# Patient Record
Sex: FEMALE | Race: WHITE | Employment: UNEMPLOYED | URBAN - METROPOLITAN AREA
[De-identification: names, ages, dates, MRNs, and addresses within clinical notes are randomized per-mention and may not be internally consistent; named-entity substitution may affect disease eponyms.]

---

## 2018-01-27 ENCOUNTER — HOSPITAL ENCOUNTER (OUTPATIENT)
Facility: HOSPITAL | Age: 33
Setting detail: OBSERVATION
Discharge: HOME | End: 2018-01-28
Attending: EMERGENCY MEDICINE | Admitting: HOSPITALIST
Payer: COMMERCIAL

## 2018-01-27 DIAGNOSIS — Z94.0 S/P KIDNEY TRANSPLANT: ICD-10-CM

## 2018-01-27 DIAGNOSIS — R11.2 NAUSEA VOMITING AND DIARRHEA: Primary | ICD-10-CM

## 2018-01-27 DIAGNOSIS — D72.829 LEUKOCYTOSIS, UNSPECIFIED TYPE: ICD-10-CM

## 2018-01-27 DIAGNOSIS — R19.7 NAUSEA VOMITING AND DIARRHEA: Primary | ICD-10-CM

## 2018-01-27 DIAGNOSIS — N28.9 RENAL INSUFFICIENCY: ICD-10-CM

## 2018-01-27 LAB
ALBUMIN SERPL-MCNC: 4.1 G/DL (ref 3.5–4.8)
ALP LIVER SERPL-CCNC: 124 U/L (ref 37–98)
ALT SERPL-CCNC: 44 U/L (ref 14–54)
AST SERPL-CCNC: 17 U/L (ref 15–41)
BASOPHILS # BLD AUTO: 0.08 X10(3) UL (ref 0–0.1)
BASOPHILS NFR BLD AUTO: 0.4 %
BILIRUB SERPL-MCNC: 0.6 MG/DL (ref 0.1–2)
BUN BLD-MCNC: 32 MG/DL (ref 8–20)
CALCIUM BLD-MCNC: 10.2 MG/DL (ref 8.3–10.3)
CHLORIDE: 103 MMOL/L (ref 101–111)
CO2: 21 MMOL/L (ref 22–32)
CREAT BLD-MCNC: 1.68 MG/DL (ref 0.55–1.02)
EOSINOPHIL # BLD AUTO: 0.3 X10(3) UL (ref 0–0.3)
EOSINOPHIL NFR BLD AUTO: 1.5 %
ERYTHROCYTE [DISTWIDTH] IN BLOOD BY AUTOMATED COUNT: 15 % (ref 11.5–16)
GLUCOSE BLD-MCNC: 147 MG/DL (ref 70–99)
HCT VFR BLD AUTO: 33.1 % (ref 34–50)
HGB BLD-MCNC: 10.8 G/DL (ref 12–16)
IMMATURE GRANULOCYTE COUNT: 0.11 X10(3) UL (ref 0–1)
IMMATURE GRANULOCYTE RATIO %: 0.6 %
LYMPHOCYTES # BLD AUTO: 1.48 X10(3) UL (ref 0.9–4)
LYMPHOCYTES NFR BLD AUTO: 7.5 %
M PROTEIN MFR SERPL ELPH: 8 G/DL (ref 6.1–8.3)
MCH RBC QN AUTO: 26.4 PG (ref 27–33.2)
MCHC RBC AUTO-ENTMCNC: 32.6 G/DL (ref 31–37)
MCV RBC AUTO: 80.9 FL (ref 81–100)
MONOCYTES # BLD AUTO: 1.01 X10(3) UL (ref 0.1–0.6)
MONOCYTES NFR BLD AUTO: 5.1 %
NEUTROPHIL ABS PRELIM: 16.76 X10 (3) UL (ref 1.3–6.7)
NEUTROPHILS # BLD AUTO: 16.76 X10(3) UL (ref 1.3–6.7)
NEUTROPHILS NFR BLD AUTO: 84.9 %
PLATELET # BLD AUTO: 526 10(3)UL (ref 150–450)
POTASSIUM SERPL-SCNC: 4.7 MMOL/L (ref 3.6–5.1)
RBC # BLD AUTO: 4.09 X10(6)UL (ref 3.8–5.1)
RED CELL DISTRIBUTION WIDTH-SD: 44 FL (ref 35.1–46.3)
SODIUM SERPL-SCNC: 135 MMOL/L (ref 136–144)
WBC # BLD AUTO: 19.7 X10(3) UL (ref 4–13)

## 2018-01-27 PROCEDURE — 99220 INITIAL OBSERVATION CARE,LEVL III: CPT | Performed by: HOSPITALIST

## 2018-01-27 RX ORDER — SODIUM CHLORIDE 9 MG/ML
INJECTION, SOLUTION INTRAVENOUS ONCE
Status: COMPLETED | OUTPATIENT
Start: 2018-01-27 | End: 2018-01-27

## 2018-01-27 RX ORDER — DEXTROSE, SODIUM CHLORIDE, SODIUM LACTATE, POTASSIUM CHLORIDE, AND CALCIUM CHLORIDE 5; .6; .31; .03; .02 G/100ML; G/100ML; G/100ML; G/100ML; G/100ML
INJECTION, SOLUTION INTRAVENOUS CONTINUOUS
Status: DISCONTINUED | OUTPATIENT
Start: 2018-01-27 | End: 2018-01-28

## 2018-01-27 RX ORDER — MORPHINE SULFATE 2 MG/ML
4 INJECTION, SOLUTION INTRAMUSCULAR; INTRAVENOUS EVERY 2 HOUR PRN
Status: CANCELLED | OUTPATIENT
Start: 2018-01-27

## 2018-01-27 RX ORDER — ONDANSETRON 2 MG/ML
4 INJECTION INTRAMUSCULAR; INTRAVENOUS ONCE
Status: COMPLETED | OUTPATIENT
Start: 2018-01-27 | End: 2018-01-27

## 2018-01-27 RX ORDER — MYCOPHENOLIC ACID 360 MG/1
360 TABLET, DELAYED RELEASE ORAL
Status: DISCONTINUED | OUTPATIENT
Start: 2018-01-27 | End: 2018-01-27

## 2018-01-27 RX ORDER — ONDANSETRON 2 MG/ML
4 INJECTION INTRAMUSCULAR; INTRAVENOUS EVERY 4 HOURS PRN
Status: DISCONTINUED | OUTPATIENT
Start: 2018-01-27 | End: 2018-01-28

## 2018-01-27 RX ORDER — ONDANSETRON 2 MG/ML
4 INJECTION INTRAMUSCULAR; INTRAVENOUS EVERY 6 HOURS PRN
Status: DISCONTINUED | OUTPATIENT
Start: 2018-01-27 | End: 2018-01-28

## 2018-01-27 RX ORDER — TACROLIMUS 1 MG/1
3 CAPSULE ORAL 2 TIMES DAILY
Status: DISCONTINUED | OUTPATIENT
Start: 2018-01-27 | End: 2018-01-28

## 2018-01-27 RX ORDER — TACROLIMUS 1 MG/1
2 CAPSULE ORAL 2 TIMES DAILY
Status: DISCONTINUED | OUTPATIENT
Start: 2018-01-27 | End: 2018-01-27

## 2018-01-27 RX ORDER — MORPHINE SULFATE 2 MG/ML
2 INJECTION, SOLUTION INTRAMUSCULAR; INTRAVENOUS EVERY 2 HOUR PRN
Status: DISCONTINUED | OUTPATIENT
Start: 2018-01-27 | End: 2018-01-28

## 2018-01-27 RX ORDER — MYCOPHENOLIC ACID 180 MG/1
180 TABLET, DELAYED RELEASE ORAL
Status: DISCONTINUED | OUTPATIENT
Start: 2018-01-27 | End: 2018-01-27

## 2018-01-27 RX ORDER — MORPHINE SULFATE 2 MG/ML
2 INJECTION, SOLUTION INTRAMUSCULAR; INTRAVENOUS EVERY 2 HOUR PRN
Status: CANCELLED | OUTPATIENT
Start: 2018-01-27

## 2018-01-27 RX ORDER — MORPHINE SULFATE 2 MG/ML
4 INJECTION, SOLUTION INTRAMUSCULAR; INTRAVENOUS EVERY 2 HOUR PRN
Status: DISCONTINUED | OUTPATIENT
Start: 2018-01-27 | End: 2018-01-28

## 2018-01-27 RX ORDER — MYCOPHENOLIC ACID 180 MG/1
180 TABLET, DELAYED RELEASE ORAL
Status: DISCONTINUED | OUTPATIENT
Start: 2018-01-28 | End: 2018-01-28

## 2018-01-27 RX ORDER — DIPHENHYDRAMINE HYDROCHLORIDE 50 MG/ML
25 INJECTION INTRAMUSCULAR; INTRAVENOUS NIGHTLY PRN
Status: DISCONTINUED | OUTPATIENT
Start: 2018-01-27 | End: 2018-01-28

## 2018-01-27 RX ORDER — METOCLOPRAMIDE HYDROCHLORIDE 5 MG/ML
5 INJECTION INTRAMUSCULAR; INTRAVENOUS EVERY 8 HOURS PRN
Status: DISCONTINUED | OUTPATIENT
Start: 2018-01-27 | End: 2018-01-28

## 2018-01-27 RX ORDER — ACETAMINOPHEN 325 MG/1
650 TABLET ORAL EVERY 6 HOURS PRN
Status: DISCONTINUED | OUTPATIENT
Start: 2018-01-27 | End: 2018-01-28

## 2018-01-27 NOTE — ED PROVIDER NOTES
Patient Seen in: [facility] In [place]    History   Patient presents with:  Abdomen/Flank Pain (GI/)    Stated Complaint: ABDOMINAL PAIN DIARRHEA    HPI    This is a 33-year-old female who presents with vomiting diarrhea.   She is  DIARRHEA  Other systems are as noted in HPI. Constitutional and vital signs reviewed. All other systems reviewed and negative except as noted above.     Physical Exam   ED Triage Vitals [01/27/18 0442]  BP: 124/78  Pulse: 122  Resp: 20  Temp: 98.2 °F ------                     CBC W/ DIFFERENTIAL[[id number]]          Abnormal            Final result                 Please view results for these tests on the individual orders.    URINALYSIS WITH CULTURE REFLEX   POCT PREGNANCY, URINE   RAINBOW DRAW B

## 2018-01-27 NOTE — ED INITIAL ASSESSMENT (HPI)
STS GENERALIZED ABD PAIN ALL DAY YEST. STS EMESIS X 3 STARTING AT MIDNIGHT WITH DIARRHEA. STS UNABLE TO TOLERATE PO INTAKE. HISTORY OF RENAL TRANSPLANT.

## 2018-01-27 NOTE — PROGRESS NOTES
[facility] Pharmacy Note:  Renal Dose Adjustment for Metoclopramide (REGLAN)    Jae Schafer has been prescribed Metoclopramide (REGLAN) 10 mg every 6 hours as needed for for nausea/vomiting.     Estimated Creatinine Clearance: 33.8 mL/min (based on SCr of

## 2018-01-27 NOTE — PROGRESS NOTES
NURSING ADMISSION NOTE      Patient admitted via Ambulance  Oriented to room. Safety precautions initiated. Bed in low position. Call light in reach. Admission navigator complete.     Pt received from [facility] via ambulance, VSS, currently no c

## 2018-01-27 NOTE — H&P
[facility]  History and Physical     Tommy Woodward Patient Status:  Observation    1985 MRN [id number]   [facility] 5NW-A Attending Moni Amor MD   Hosp Day # 0 PCP No primary care provider on file.      Chief Compl of Systems:   A comprehensive 14 point review of systems was completed. Pertinent positives and negatives noted in the HPI.     Physical Exam:    /74 (BP Location: Right arm)   Pulse 117   Temp 99.5 °F (37.5 °C) (Oral)   Resp 20   Ht 157.5 cm (5' 2 leukocytosis  1. Monitor  5. Hyperglycemia  1. Reactive  6. Hyponatremia  7.  Metabolic acidosis      Quality:  · DVT Prophylaxis: SCD  · CODE status: full  · Castro: no    Plan of care discussed with ED physician    Lalo Faustin MD  1/27/2018

## 2018-01-28 VITALS
TEMPERATURE: 98 F | RESPIRATION RATE: 18 BRPM | DIASTOLIC BLOOD PRESSURE: 62 MMHG | HEIGHT: 62 IN | OXYGEN SATURATION: 99 % | BODY MASS INDEX: 18.8 KG/M2 | HEART RATE: 71 BPM | SYSTOLIC BLOOD PRESSURE: 92 MMHG | WEIGHT: 102.19 LBS

## 2018-01-28 LAB
BASOPHILS # BLD AUTO: 0.03 X10(3) UL (ref 0–0.1)
BASOPHILS NFR BLD AUTO: 0.6 %
BILIRUB UR QL STRIP.AUTO: NEGATIVE
BUN BLD-MCNC: 17 MG/DL (ref 8–20)
CALCIUM BLD-MCNC: 9.2 MG/DL (ref 8.3–10.3)
CHLORIDE: 114 MMOL/L (ref 101–111)
CLARITY UR REFRACT.AUTO: CLEAR
CO2: 24 MMOL/L (ref 22–32)
COLOR UR AUTO: YELLOW
CREAT BLD-MCNC: 1.24 MG/DL (ref 0.55–1.02)
EOSINOPHIL # BLD AUTO: 0.22 X10(3) UL (ref 0–0.3)
EOSINOPHIL NFR BLD AUTO: 4.1 %
ERYTHROCYTE [DISTWIDTH] IN BLOOD BY AUTOMATED COUNT: 15.2 % (ref 11.5–16)
GLUCOSE BLD-MCNC: 137 MG/DL (ref 70–99)
GLUCOSE UR STRIP.AUTO-MCNC: NEGATIVE MG/DL
HCG URINE QUALITATIVE: NEGATIVE
HCT VFR BLD AUTO: 26.6 % (ref 34–50)
HGB BLD-MCNC: 8.6 G/DL (ref 12–16)
IMMATURE GRANULOCYTE COUNT: 0.02 X10(3) UL (ref 0–1)
IMMATURE GRANULOCYTE RATIO %: 0.4 %
KETONES UR STRIP.AUTO-MCNC: NEGATIVE MG/DL
LYMPHOCYTES # BLD AUTO: 2.19 X10(3) UL (ref 0.9–4)
LYMPHOCYTES NFR BLD AUTO: 40.7 %
MCH RBC QN AUTO: 26.5 PG (ref 27–33.2)
MCHC RBC AUTO-ENTMCNC: 32.3 G/DL (ref 31–37)
MCV RBC AUTO: 81.8 FL (ref 81–100)
MONOCYTES # BLD AUTO: 0.63 X10(3) UL (ref 0.1–0.6)
MONOCYTES NFR BLD AUTO: 11.7 %
NEUTROPHIL ABS PRELIM: 2.29 X10 (3) UL (ref 1.3–6.7)
NEUTROPHILS # BLD AUTO: 2.29 X10(3) UL (ref 1.3–6.7)
NEUTROPHILS NFR BLD AUTO: 42.5 %
NITRITE UR QL STRIP.AUTO: NEGATIVE
PH UR STRIP.AUTO: 5 [PH] (ref 4.5–8)
PLATELET # BLD AUTO: 350 10(3)UL (ref 150–450)
POTASSIUM SERPL-SCNC: 4.1 MMOL/L (ref 3.6–5.1)
PROT UR STRIP.AUTO-MCNC: NEGATIVE MG/DL
RBC # BLD AUTO: 3.25 X10(6)UL (ref 3.8–5.1)
RED CELL DISTRIBUTION WIDTH-SD: 46 FL (ref 35.1–46.3)
SODIUM SERPL-SCNC: 142 MMOL/L (ref 136–144)
SP GR UR STRIP.AUTO: 1 (ref 1–1.03)
UROBILINOGEN UR STRIP.AUTO-MCNC: <2 MG/DL
WBC # BLD AUTO: 5.4 X10(3) UL (ref 4–13)

## 2018-01-28 PROCEDURE — 99217 OBSERVATION CARE DISCHARGE: CPT | Performed by: HOSPITALIST

## 2018-01-28 NOTE — PLAN OF CARE
GASTROINTESTINAL - ADULT    • Minimal or absence of nausea and vomiting Adequate for Discharge    • Maintains or returns to baseline bowel function Adequate for Discharge        METABOLIC/FLUID AND ELECTROLYTES - ADULT    • Electrolytes maintained within n

## 2018-01-29 NOTE — DISCHARGE SUMMARY
[facility]  DISCHARGE SUMMARY     Kateymarion Platt Trace Patient Status:  Observation    1985 MRN [id number]   [facility] 5NW-A Attending No att. providers found   Hosp Day # 0 PCP No primary care provider on file.      Date of Adm dysfunction resolved with IV fluid hydration. The patient reactive leukocytosis which also resolved. The patient had significant improvement in his symptoms with IV fluid hydration. Diarrhea resolved. She was stable for discharge home.       Discharge M

## 2024-01-07 ENCOUNTER — APPOINTMENT (OUTPATIENT)
Dept: CT IMAGING | Age: 39
End: 2024-01-07
Attending: EMERGENCY MEDICINE
Payer: COMMERCIAL

## 2024-01-07 ENCOUNTER — HOSPITAL ENCOUNTER (EMERGENCY)
Age: 39
Discharge: HOME | End: 2024-01-07
Attending: EMERGENCY MEDICINE
Payer: COMMERCIAL

## 2024-01-07 VITALS
RESPIRATION RATE: 17 BRPM | TEMPERATURE: 98 F | HEIGHT: 62 IN | OXYGEN SATURATION: 99 % | WEIGHT: 95 LBS | DIASTOLIC BLOOD PRESSURE: 90 MMHG | SYSTOLIC BLOOD PRESSURE: 129 MMHG | BODY MASS INDEX: 17.48 KG/M2 | HEART RATE: 105 BPM

## 2024-01-07 DIAGNOSIS — N30.90 CYSTITIS: Primary | ICD-10-CM

## 2024-01-07 DIAGNOSIS — R10.2 SUPRAPUBIC PAIN, ACUTE: ICD-10-CM

## 2024-01-07 LAB
ALBUMIN SERPL-MCNC: 4.3 G/DL (ref 3.4–5)
ALBUMIN/GLOB SERPL: 1.1 {RATIO} (ref 1–2)
ALP LIVER SERPL-CCNC: 143 U/L
ALT SERPL-CCNC: 33 U/L
ANION GAP SERPL CALC-SCNC: 7 MMOL/L (ref 0–18)
AST SERPL-CCNC: 18 U/L (ref 15–37)
B-HCG UR QL: NEGATIVE
BASOPHILS # BLD AUTO: 0.09 X10(3) UL (ref 0–0.2)
BASOPHILS NFR BLD AUTO: 0.5 %
BILIRUB SERPL-MCNC: 0.5 MG/DL (ref 0.1–2)
BILIRUB UR QL STRIP.AUTO: NEGATIVE
BUN BLD-MCNC: 34 MG/DL (ref 9–23)
CALCIUM BLD-MCNC: 10.2 MG/DL (ref 8.5–10.1)
CHLORIDE SERPL-SCNC: 105 MMOL/L (ref 98–112)
CLARITY UR REFRACT.AUTO: CLEAR
CO2 SERPL-SCNC: 23 MMOL/L (ref 21–32)
COLOR UR AUTO: YELLOW
CREAT BLD-MCNC: 1.63 MG/DL
EGFRCR SERPLBLD CKD-EPI 2021: 41 ML/MIN/1.73M2 (ref 60–?)
EOSINOPHIL # BLD AUTO: 0.16 X10(3) UL (ref 0–0.7)
EOSINOPHIL NFR BLD AUTO: 0.9 %
ERYTHROCYTE [DISTWIDTH] IN BLOOD BY AUTOMATED COUNT: 13.4 %
GLOBULIN PLAS-MCNC: 3.9 G/DL (ref 2.8–4.4)
GLUCOSE BLD-MCNC: 126 MG/DL (ref 70–99)
GLUCOSE UR STRIP.AUTO-MCNC: NEGATIVE MG/DL
HCT VFR BLD AUTO: 35.5 %
HGB BLD-MCNC: 11.5 G/DL
IMM GRANULOCYTES # BLD AUTO: 0.08 X10(3) UL (ref 0–1)
IMM GRANULOCYTES NFR BLD: 0.4 %
KETONES UR STRIP.AUTO-MCNC: NEGATIVE MG/DL
LIPASE SERPL-CCNC: 28 U/L (ref 13–75)
LYMPHOCYTES # BLD AUTO: 2.99 X10(3) UL (ref 1–4)
LYMPHOCYTES NFR BLD AUTO: 16.8 %
MCH RBC QN AUTO: 27.4 PG (ref 26–34)
MCHC RBC AUTO-ENTMCNC: 32.4 G/DL (ref 31–37)
MCV RBC AUTO: 84.7 FL
MONOCYTES # BLD AUTO: 1.56 X10(3) UL (ref 0.1–1)
MONOCYTES NFR BLD AUTO: 8.8 %
NEUTROPHILS # BLD AUTO: 12.9 X10 (3) UL (ref 1.5–7.7)
NEUTROPHILS # BLD AUTO: 12.9 X10(3) UL (ref 1.5–7.7)
NEUTROPHILS NFR BLD AUTO: 72.6 %
NITRITE UR QL STRIP.AUTO: NEGATIVE
OSMOLALITY SERPL CALC.SUM OF ELEC: 289 MOSM/KG (ref 275–295)
PH UR STRIP.AUTO: 5.5 [PH] (ref 5–8)
PLATELET # BLD AUTO: 415 10(3)UL (ref 150–450)
POTASSIUM SERPL-SCNC: 4.6 MMOL/L (ref 3.5–5.1)
PROT SERPL-MCNC: 8.2 G/DL (ref 6.4–8.2)
PROT UR STRIP.AUTO-MCNC: NEGATIVE MG/DL
RBC # BLD AUTO: 4.19 X10(6)UL
SODIUM SERPL-SCNC: 135 MMOL/L (ref 136–145)
SP GR UR STRIP.AUTO: 1.01 (ref 1–1.03)
UROBILINOGEN UR STRIP.AUTO-MCNC: 0.2 MG/DL
WBC # BLD AUTO: 17.8 X10(3) UL (ref 4–11)

## 2024-01-07 PROCEDURE — 87086 URINE CULTURE/COLONY COUNT: CPT | Performed by: EMERGENCY MEDICINE

## 2024-01-07 PROCEDURE — 96375 TX/PRO/DX INJ NEW DRUG ADDON: CPT

## 2024-01-07 PROCEDURE — 99284 EMERGENCY DEPT VISIT MOD MDM: CPT

## 2024-01-07 PROCEDURE — 96365 THER/PROPH/DIAG IV INF INIT: CPT

## 2024-01-07 PROCEDURE — 96376 TX/PRO/DX INJ SAME DRUG ADON: CPT

## 2024-01-07 PROCEDURE — 85025 COMPLETE CBC W/AUTO DIFF WBC: CPT | Performed by: EMERGENCY MEDICINE

## 2024-01-07 PROCEDURE — 74176 CT ABD & PELVIS W/O CONTRAST: CPT | Performed by: EMERGENCY MEDICINE

## 2024-01-07 PROCEDURE — 81001 URINALYSIS AUTO W/SCOPE: CPT | Performed by: EMERGENCY MEDICINE

## 2024-01-07 PROCEDURE — 81025 URINE PREGNANCY TEST: CPT

## 2024-01-07 PROCEDURE — 83690 ASSAY OF LIPASE: CPT | Performed by: EMERGENCY MEDICINE

## 2024-01-07 PROCEDURE — 81015 MICROSCOPIC EXAM OF URINE: CPT | Performed by: EMERGENCY MEDICINE

## 2024-01-07 PROCEDURE — 80053 COMPREHEN METABOLIC PANEL: CPT | Performed by: EMERGENCY MEDICINE

## 2024-01-07 PROCEDURE — 96361 HYDRATE IV INFUSION ADD-ON: CPT

## 2024-01-07 RX ORDER — NITROFURANTOIN 25; 75 MG/1; MG/1
100 CAPSULE ORAL 2 TIMES DAILY
COMMUNITY
End: 2024-01-09

## 2024-01-07 RX ORDER — HYDROMORPHONE HYDROCHLORIDE 1 MG/ML
0.5 INJECTION, SOLUTION INTRAMUSCULAR; INTRAVENOUS; SUBCUTANEOUS EVERY 30 MIN PRN
Status: COMPLETED | OUTPATIENT
Start: 2024-01-07 | End: 2024-01-07

## 2024-01-07 RX ORDER — PHENAZOPYRIDINE HYDROCHLORIDE 200 MG/1
200 TABLET, FILM COATED ORAL ONCE
Status: COMPLETED | OUTPATIENT
Start: 2024-01-07 | End: 2024-01-07

## 2024-01-07 RX ORDER — HYDROCODONE BITARTRATE AND ACETAMINOPHEN 5; 325 MG/1; MG/1
1 TABLET ORAL ONCE
Status: COMPLETED | OUTPATIENT
Start: 2024-01-07 | End: 2024-01-07

## 2024-01-07 RX ORDER — ONDANSETRON 2 MG/ML
4 INJECTION INTRAMUSCULAR; INTRAVENOUS
Status: COMPLETED | OUTPATIENT
Start: 2024-01-07 | End: 2024-01-07

## 2024-01-07 RX ORDER — CEPHALEXIN 500 MG/1
500 CAPSULE ORAL 4 TIMES DAILY
Qty: 40 CAPSULE | Refills: 0 | Status: SHIPPED | OUTPATIENT
Start: 2024-01-07 | End: 2024-01-09

## 2024-01-07 RX ORDER — ONDANSETRON 8 MG/1
8 TABLET, ORALLY DISINTEGRATING ORAL EVERY 6 HOURS PRN
Qty: 10 TABLET | Refills: 0 | Status: SHIPPED | OUTPATIENT
Start: 2024-01-07

## 2024-01-07 RX ORDER — HYDROCODONE BITARTRATE AND ACETAMINOPHEN 5; 325 MG/1; MG/1
1-2 TABLET ORAL EVERY 6 HOURS PRN
Qty: 10 TABLET | Refills: 0 | Status: SHIPPED | OUTPATIENT
Start: 2024-01-07 | End: 2024-01-09

## 2024-01-07 NOTE — ED PROVIDER NOTES
Patient Seen in: [facility] In [place]      History     Chief Complaint   Patient presents with    Abdomen/Flank Pain     Stated Complaint: ABD pain    Subjective:   HPI    Patient with a history of chronic kidney disease with neurogenic bladder self-catheterization and anemia.  Patient complaining of pelvic pain that started 2 days ago.  She went to an urgent care yesterday.  She had concerns about the possibility of urinary tract infection.  Urine test there reportedly showed blood and she was started on antibiotic.  She continues to have pain and gestures over the pelvis as location of discomfort.  She cannot localize it very well left or right.  There is nausea with her symptoms but no vomiting.  No flank pain.  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              Past Surgical History:   Procedure Laterality Date    CHOLECYSTECTOMY  2011    TRANSPLANTATION OF KIDNEY  2013                Social History     Socioeconomic History    Marital status:     Number of children: 1   Tobacco Use    Smoking status: Never    Smokeless tobacco: Never   Substance and Sexual Activity    Alcohol use: No    Drug use: No    Sexual activity: Yes     Partners: Male     Comment: no contraception - but not able to get pregnant, pt has never had a period - adopted a child              Review of Systems    Positive for stated complaint: ABD pain  Other systems are as noted in HPI.  Constitutional and vital signs reviewed.      All other systems reviewed and negative except as noted above.    Physical Exam     ED Triage Vitals [01/07/24 1654]   BP (!) 146/93    Pulse 94   Resp 22   Temp 97.6 °F (36.4 °C)   Temp src Oral   SpO2 100 %   O2 Device None (Room air)       Current:/90   Pulse 105   Temp 97.6 °F (36.4 °C) (Oral)   Resp 17   Ht 157.5 cm (5' 2\")   Wt 43.1 kg   SpO2 99%   BMI 17.38 kg/m²         Physical Exam  General: The patient is awake, alert, conversant.  She appears in significant distress secondary to pain  Eyes: sclera white, conjunctiva pink and moist.  Lids and lashes are normal.  Lungs: Clear to auscultation bilaterally.  No rhonchi or rales.  Heart: Normal S1 and S2, without murmur.  Distal pulses are strong and symmetric.  Abdomen: Soft, nondistended.  Multiple well-healed scars.  Significantly tender throughout the lower quadrants bilaterally especially in the right lower quadrant and into the right pelvis  Extremities: Unremarkable.  Calves nonswollen, symmetric  Skin: Somewhat pale  Neurologic:  Mental status as above.  Patient moves all extremities with good strength and coordination.           ED Course     Labs Reviewed   URINALYSIS, ROUTINE - Abnormal; Notable for the following components:       Result Value    Blood Urine Small (*)     Leukocyte Esterase Urine Small (*)     All other components within normal limits   COMP METABOLIC PANEL (14) - Abnormal; Notable for the following components:    Glucose 126 (*)     Sodium 135 (*)     BUN 34 (*)     Creatinine 1.63 (*)     Calcium, Total 10.2 (*)     eGFR-Cr 41 (*)     Alkaline Phosphatase 143 (*)     All other components within normal limits   UA MICROSCOPIC ONLY, URINE - Abnormal; Notable for the following components:    WBC Urine 11-20 (*)     Bacteria Urine Rare (*)     Squamous Epi. Cells Few (*)     All other components within normal limits   CBC W/ DIFFERENTIAL - Abnormal; Notable for the following components:    WBC 17.8 (*)     HGB 11.5 (*)     Neutrophil Absolute Prelim 12.90 (*)     Neutrophil Absolute 12.90 (*)     Monocyte Absolute 1.56 (*)     All other components  within normal limits   LIPASE - Normal   POCT PREGNANCY URINE - Normal   CBC WITH DIFFERENTIAL WITH PLATELET    Narrative:     The following orders were created for panel order CBC With Differential With Platelet.  Procedure                               Abnormality         Status                     ---------                               -----------         ------                     CBC W/ DIFFERENTIAL[[id number]]          Abnormal            Final result                 Please view results for these tests on the individual orders.   URINE CULTURE, ROUTINE                      MDM      Patient complaining of pelvic pain and appears significantly uncomfortable.  The results of the urine test at the urgent care are not immediately available to me but her symptoms do not sound like typical cystitis.  Renal colic include the differential.  Ovarian pathology also consideration.  With right lower quadrant pain, appendicitis can be considered.    Patient treated with IV fluid and offered pain and nausea medicine.  She was also treated with Pyridium    CBC shows significantly elevated white count with a strong predominance of neutrophils on differential  Metabolic panel shows elevation of creatinine 1.6, previous 1.38.  Electrolytes normal.  LFTs normal.  Lipase normal  Urinalysis shows 11-20 WBCs with positive leukocyte esterase from a self cath specimen    CT abdomen pelvis  I personally reviewed the actual radiographs themselves and my individual interpretation shows no kidney stones but inflammatory changes of the bladder  radiologist's formal interpretation which I have reviewed  CONCLUSION:    1. Moderate concentric thickening of the urinary bladder indicating cystitis.  Recommend correlation with urinalysis.   2. Mild fluid distention of distal small bowel loops with associated edema/congestion of the small bowel mesentery suggesting infectious/inflammatory enteritis.  Correlate with lower GI symptoms.   3. Large  stool volume noted throughout the colon.   4. Clustered tree-in-bud opacities of the visualized right middle and right lower lobes indicating infectious bronchiolitis or aspiration.    On repeat examination, patient appears much more comfortable.  I reviewed the results of the workup.  Patient is without any cough or cold symptoms or chest pain.  Unclear the clinical significance of the chest findings.  Patient notes that she has been more on the constipated side.  I would recommend a mild cathartic such as prune juice or even milk of magnesia or MiraLAX to see if evacuations alleviate some of her symptoms.  There is significant white blood cells in the urine and findings of cystitis on the CT imaging.  Patient treated with Rocephin antibiotic.  I will discharge home Keflex pending urine culture.  I recommend plenty of fluids and rest.  Patient without any loose stool or diarrhea and clinical significance of the congestion of the small bowel is unclear as well.  I discussed all these findings with the patient and parents.  Patient was feeling better and in agreement the plan.    Clinical Impression:  1. Cystitis    2. Suprapubic pain, acute         Disposition:  Discharge  1/7/2024  8:30 pm    Follow-up:  Edgar Beck MD  [address] [facility] [id number] [phone]    Call  As needed    Amina Mishra  [address] [place] [address]  [address]  [facility] [address] [phone]                Medications Prescribed:  Current Discharge Medication List        START taking these medications    Details   cephalexin 500 MG Oral Cap Take 1 capsule (500 mg total) by mouth 4 (four) times daily for 10 days.  Qty: 40 capsule, Refills: 0      HYDROcodone-acetaminophen 5-325 MG Oral Tab Take 1-2 tablets by mouth every 6 (six) hours as needed for Pain.  Qty: 10 tablet, Refills: 0    Associated Diagnoses: Suprapubic pain, acute

## 2024-01-07 NOTE — ED INITIAL ASSESSMENT (HPI)
Pt reports bladder/pelvic pain with urinary frequency since last noc. Pt states she went to urgent care yesterday, was dx with uti and started on macrobid.

## 2024-01-08 ENCOUNTER — HOSPITAL ENCOUNTER (INPATIENT)
Facility: HOSPITAL | Age: 39
LOS: 1 days | Discharge: HOME | End: 2024-01-09
Attending: EMERGENCY MEDICINE | Admitting: HOSPITALIST
Payer: COMMERCIAL

## 2024-01-08 ENCOUNTER — ANESTHESIA (OUTPATIENT)
Dept: SURGERY | Facility: HOSPITAL | Age: 39
End: 2024-01-08
Payer: COMMERCIAL

## 2024-01-08 ENCOUNTER — APPOINTMENT (OUTPATIENT)
Dept: CT IMAGING | Facility: HOSPITAL | Age: 39
End: 2024-01-08
Attending: EMERGENCY MEDICINE
Payer: COMMERCIAL

## 2024-01-08 ENCOUNTER — HOSPITAL ENCOUNTER (OUTPATIENT)
Facility: HOSPITAL | Age: 39
Setting detail: OBSERVATION
Discharge: HOME | End: 2024-01-09
Attending: EMERGENCY MEDICINE | Admitting: HOSPITALIST
Payer: COMMERCIAL

## 2024-01-08 ENCOUNTER — ANESTHESIA EVENT (OUTPATIENT)
Dept: SURGERY | Facility: HOSPITAL | Age: 39
End: 2024-01-08
Payer: COMMERCIAL

## 2024-01-08 DIAGNOSIS — K35.32 ACUTE APPENDICITIS WITH PERFORATION AND LOCALIZED PERITONITIS, WITHOUT ABSCESS OR GANGRENE: Primary | ICD-10-CM

## 2024-01-08 DIAGNOSIS — K56.609 SBO (SMALL BOWEL OBSTRUCTION) (HCC): ICD-10-CM

## 2024-01-08 DIAGNOSIS — A41.9 SEPSIS DUE TO UNDETERMINED ORGANISM (HCC): ICD-10-CM

## 2024-01-08 DIAGNOSIS — R10.9 ABDOMINAL PAIN OF UNKNOWN ETIOLOGY: ICD-10-CM

## 2024-01-08 PROBLEM — K52.9 ENTERITIS: Status: ACTIVE | Noted: 2024-01-08

## 2024-01-08 PROBLEM — D84.9 IMMUNOSUPPRESSED STATUS (HCC): Status: ACTIVE | Noted: 2024-01-08

## 2024-01-08 LAB
ALBUMIN SERPL-MCNC: 3.9 G/DL (ref 3.4–5)
ALBUMIN/GLOB SERPL: 0.9 {RATIO} (ref 1–2)
ALP LIVER SERPL-CCNC: 186 U/L
ALT SERPL-CCNC: 447 U/L
ANION GAP SERPL CALC-SCNC: 7 MMOL/L (ref 0–18)
ANTIBODY SCREEN: NEGATIVE
AST SERPL-CCNC: 190 U/L (ref 15–37)
B-HCG UR QL: NEGATIVE
BASOPHILS # BLD AUTO: 0.1 X10(3) UL (ref 0–0.2)
BASOPHILS NFR BLD AUTO: 0.4 %
BILIRUB SERPL-MCNC: 0.8 MG/DL (ref 0.1–2)
BUN BLD-MCNC: 25 MG/DL (ref 9–23)
CALCIUM BLD-MCNC: 10.3 MG/DL (ref 8.5–10.1)
CHLORIDE SERPL-SCNC: 104 MMOL/L (ref 98–112)
CO2 SERPL-SCNC: 24 MMOL/L (ref 21–32)
CREAT BLD-MCNC: 1.78 MG/DL
EGFRCR SERPLBLD CKD-EPI 2021: 37 ML/MIN/1.73M2 (ref 60–?)
EOSINOPHIL # BLD AUTO: 0.01 X10(3) UL (ref 0–0.7)
EOSINOPHIL NFR BLD AUTO: 0 %
ERYTHROCYTE [DISTWIDTH] IN BLOOD BY AUTOMATED COUNT: 13.3 %
GLOBULIN PLAS-MCNC: 4.2 G/DL (ref 2.8–4.4)
GLUCOSE BLD-MCNC: 114 MG/DL (ref 70–99)
HCT VFR BLD AUTO: 36.3 %
HGB BLD-MCNC: 11.6 G/DL
IMM GRANULOCYTES # BLD AUTO: 0.22 X10(3) UL (ref 0–1)
IMM GRANULOCYTES NFR BLD: 0.8 %
LACTATE SERPL-SCNC: 1 MMOL/L (ref 0.4–2)
LIPASE SERPL-CCNC: 21 U/L (ref 13–75)
LYMPHOCYTES # BLD AUTO: 1.83 X10(3) UL (ref 1–4)
LYMPHOCYTES NFR BLD AUTO: 6.9 %
MCH RBC QN AUTO: 27.3 PG (ref 26–34)
MCHC RBC AUTO-ENTMCNC: 32 G/DL (ref 31–37)
MCV RBC AUTO: 85.4 FL
MONOCYTES # BLD AUTO: 2.65 X10(3) UL (ref 0.1–1)
MONOCYTES NFR BLD AUTO: 10.1 %
NEUTROPHILS # BLD AUTO: 21.55 X10 (3) UL (ref 1.5–7.7)
NEUTROPHILS # BLD AUTO: 21.55 X10(3) UL (ref 1.5–7.7)
NEUTROPHILS NFR BLD AUTO: 81.8 %
OSMOLALITY SERPL CALC.SUM OF ELEC: 285 MOSM/KG (ref 275–295)
PLATELET # BLD AUTO: 408 10(3)UL (ref 150–450)
POTASSIUM SERPL-SCNC: 4.4 MMOL/L (ref 3.5–5.1)
PROT SERPL-MCNC: 8.1 G/DL (ref 6.4–8.2)
RBC # BLD AUTO: 4.25 X10(6)UL
RH BLOOD TYPE: NEGATIVE
SODIUM SERPL-SCNC: 135 MMOL/L (ref 136–145)
WBC # BLD AUTO: 26.4 X10(3) UL (ref 4–11)

## 2024-01-08 PROCEDURE — 3078F DIAST BP <80 MM HG: CPT | Performed by: SURGERY

## 2024-01-08 PROCEDURE — 0DTJ4ZZ RESECTION OF APPENDIX, PERCUTANEOUS ENDOSCOPIC APPROACH: ICD-10-PCS | Performed by: SURGERY

## 2024-01-08 PROCEDURE — 99222 1ST HOSP IP/OBS MODERATE 55: CPT | Performed by: SURGERY

## 2024-01-08 PROCEDURE — 3074F SYST BP LT 130 MM HG: CPT | Performed by: SURGERY

## 2024-01-08 PROCEDURE — 3008F BODY MASS INDEX DOCD: CPT | Performed by: SURGERY

## 2024-01-08 PROCEDURE — 74176 CT ABD & PELVIS W/O CONTRAST: CPT | Performed by: EMERGENCY MEDICINE

## 2024-01-08 RX ORDER — ACETAMINOPHEN 500 MG
1000 TABLET ORAL ONCE AS NEEDED
Status: ACTIVE | OUTPATIENT
Start: 2024-01-08 | End: 2024-01-08

## 2024-01-08 RX ORDER — MEPERIDINE HYDROCHLORIDE 25 MG/ML
12.5 INJECTION INTRAMUSCULAR; INTRAVENOUS; SUBCUTANEOUS AS NEEDED
Status: DISCONTINUED | OUTPATIENT
Start: 2024-01-08 | End: 2024-01-09 | Stop reason: HOSPADM

## 2024-01-08 RX ORDER — NALOXONE HYDROCHLORIDE 0.4 MG/ML
80 INJECTION, SOLUTION INTRAMUSCULAR; INTRAVENOUS; SUBCUTANEOUS AS NEEDED
Status: DISCONTINUED | OUTPATIENT
Start: 2024-01-08 | End: 2024-01-09 | Stop reason: HOSPADM

## 2024-01-08 RX ORDER — HYDROMORPHONE HYDROCHLORIDE 1 MG/ML
0.2 INJECTION, SOLUTION INTRAMUSCULAR; INTRAVENOUS; SUBCUTANEOUS EVERY 5 MIN PRN
Status: DISCONTINUED | OUTPATIENT
Start: 2024-01-08 | End: 2024-01-09 | Stop reason: HOSPADM

## 2024-01-08 RX ORDER — SODIUM CHLORIDE 9 MG/ML
1000 INJECTION, SOLUTION INTRAVENOUS ONCE
Status: COMPLETED | OUTPATIENT
Start: 2024-01-08 | End: 2024-01-08

## 2024-01-08 RX ORDER — HYDROMORPHONE HYDROCHLORIDE 1 MG/ML
INJECTION, SOLUTION INTRAMUSCULAR; INTRAVENOUS; SUBCUTANEOUS
Status: COMPLETED
Start: 2024-01-08 | End: 2024-01-08

## 2024-01-08 RX ORDER — HYDROCODONE BITARTRATE AND ACETAMINOPHEN 5; 325 MG/1; MG/1
1 TABLET ORAL ONCE AS NEEDED
Status: ACTIVE | OUTPATIENT
Start: 2024-01-08 | End: 2024-01-08

## 2024-01-08 RX ORDER — HYDROCODONE BITARTRATE AND ACETAMINOPHEN 5; 325 MG/1; MG/1
2 TABLET ORAL ONCE AS NEEDED
Status: ACTIVE | OUTPATIENT
Start: 2024-01-08 | End: 2024-01-08

## 2024-01-08 RX ORDER — MIDAZOLAM HYDROCHLORIDE 1 MG/ML
1 INJECTION INTRAMUSCULAR; INTRAVENOUS EVERY 5 MIN PRN
Status: DISCONTINUED | OUTPATIENT
Start: 2024-01-08 | End: 2024-01-09 | Stop reason: HOSPADM

## 2024-01-08 RX ORDER — MORPHINE SULFATE 4 MG/ML
4 INJECTION, SOLUTION INTRAMUSCULAR; INTRAVENOUS ONCE
Status: COMPLETED | OUTPATIENT
Start: 2024-01-08 | End: 2024-01-08

## 2024-01-08 RX ORDER — ONDANSETRON 2 MG/ML
4 INJECTION INTRAMUSCULAR; INTRAVENOUS EVERY 6 HOURS PRN
Status: DISCONTINUED | OUTPATIENT
Start: 2024-01-08 | End: 2024-01-09 | Stop reason: HOSPADM

## 2024-01-08 RX ORDER — HYDROMORPHONE HYDROCHLORIDE 1 MG/ML
0.6 INJECTION, SOLUTION INTRAMUSCULAR; INTRAVENOUS; SUBCUTANEOUS EVERY 5 MIN PRN
Status: DISCONTINUED | OUTPATIENT
Start: 2024-01-08 | End: 2024-01-09 | Stop reason: HOSPADM

## 2024-01-08 RX ORDER — LIDOCAINE HYDROCHLORIDE 10 MG/ML
INJECTION, SOLUTION EPIDURAL; INFILTRATION; INTRACAUDAL; PERINEURAL AS NEEDED
Status: DISCONTINUED | OUTPATIENT
Start: 2024-01-08 | End: 2024-01-08 | Stop reason: SURG

## 2024-01-08 RX ORDER — BUPIVACAINE HYDROCHLORIDE AND EPINEPHRINE 5; 5 MG/ML; UG/ML
INJECTION, SOLUTION EPIDURAL; INTRACAUDAL; PERINEURAL AS NEEDED
Status: DISCONTINUED | OUTPATIENT
Start: 2024-01-08 | End: 2024-01-08 | Stop reason: HOSPADM

## 2024-01-08 RX ORDER — METRONIDAZOLE 500 MG/100ML
INJECTION, SOLUTION INTRAVENOUS AS NEEDED
Status: DISCONTINUED | OUTPATIENT
Start: 2024-01-08 | End: 2024-01-08 | Stop reason: SURG

## 2024-01-08 RX ORDER — HYDROMORPHONE HYDROCHLORIDE 1 MG/ML
0.4 INJECTION, SOLUTION INTRAMUSCULAR; INTRAVENOUS; SUBCUTANEOUS EVERY 5 MIN PRN
Status: DISCONTINUED | OUTPATIENT
Start: 2024-01-08 | End: 2024-01-09 | Stop reason: HOSPADM

## 2024-01-08 RX ORDER — ONDANSETRON 2 MG/ML
4 INJECTION INTRAMUSCULAR; INTRAVENOUS ONCE
Status: COMPLETED | OUTPATIENT
Start: 2024-01-08 | End: 2024-01-08

## 2024-01-08 RX ORDER — LABETALOL HYDROCHLORIDE 5 MG/ML
5 INJECTION, SOLUTION INTRAVENOUS EVERY 5 MIN PRN
Status: DISCONTINUED | OUTPATIENT
Start: 2024-01-08 | End: 2024-01-09 | Stop reason: HOSPADM

## 2024-01-08 RX ORDER — MIDAZOLAM HYDROCHLORIDE 1 MG/ML
1 INJECTION INTRAMUSCULAR; INTRAVENOUS ONCE
Status: DISCONTINUED | OUTPATIENT
Start: 2024-01-09 | End: 2024-01-09 | Stop reason: HOSPADM

## 2024-01-08 RX ORDER — DEXAMETHASONE SODIUM PHOSPHATE 4 MG/ML
VIAL (ML) INJECTION AS NEEDED
Status: DISCONTINUED | OUTPATIENT
Start: 2024-01-08 | End: 2024-01-08 | Stop reason: SURG

## 2024-01-08 RX ORDER — SODIUM CHLORIDE, SODIUM LACTATE, POTASSIUM CHLORIDE, CALCIUM CHLORIDE 600; 310; 30; 20 MG/100ML; MG/100ML; MG/100ML; MG/100ML
INJECTION, SOLUTION INTRAVENOUS CONTINUOUS
Status: DISCONTINUED | OUTPATIENT
Start: 2024-01-08 | End: 2024-01-09 | Stop reason: HOSPADM

## 2024-01-08 RX ORDER — ROCURONIUM BROMIDE 10 MG/ML
INJECTION, SOLUTION INTRAVENOUS AS NEEDED
Status: DISCONTINUED | OUTPATIENT
Start: 2024-01-08 | End: 2024-01-08 | Stop reason: SURG

## 2024-01-08 RX ORDER — ONDANSETRON 2 MG/ML
INJECTION INTRAMUSCULAR; INTRAVENOUS AS NEEDED
Status: DISCONTINUED | OUTPATIENT
Start: 2024-01-08 | End: 2024-01-08 | Stop reason: SURG

## 2024-01-08 RX ORDER — PROCHLORPERAZINE EDISYLATE 5 MG/ML
5 INJECTION INTRAMUSCULAR; INTRAVENOUS EVERY 8 HOURS PRN
Status: DISCONTINUED | OUTPATIENT
Start: 2024-01-08 | End: 2024-01-09 | Stop reason: HOSPADM

## 2024-01-08 RX ADMIN — ONDANSETRON 4 MG: 2 INJECTION INTRAMUSCULAR; INTRAVENOUS at 23:00:00

## 2024-01-08 RX ADMIN — METRONIDAZOLE 500 MG: 500 INJECTION, SOLUTION INTRAVENOUS at 22:15:00

## 2024-01-08 RX ADMIN — ROCURONIUM BROMIDE 40 MG: 10 INJECTION, SOLUTION INTRAVENOUS at 21:57:00

## 2024-01-08 RX ADMIN — DEXAMETHASONE SODIUM PHOSPHATE 8 MG: 4 MG/ML VIAL (ML) INJECTION at 22:32:00

## 2024-01-08 RX ADMIN — LIDOCAINE HYDROCHLORIDE 25 MG: 10 INJECTION, SOLUTION EPIDURAL; INFILTRATION; INTRACAUDAL; PERINEURAL at 21:57:00

## 2024-01-08 NOTE — ED INITIAL ASSESSMENT (HPI)
PT PRESENTS TO ED WITH SEVERE RIGHT LOWER ABD PAIN, HAS FREQUENT UTI'S.  WAS SEEN AT PED YESTERDAY.  CT SHOWED CYSTITIS, NO RELIEF TODAY, POST KIDNEY TRANSPLANT HAVING PAIN TO KIDNEY

## 2024-01-08 NOTE — DISCHARGE INSTRUCTIONS
Push fluids  Tylenol for pain  Substitute Norco for Tylenol for severe pain  Continue Pyridium    Cathartic like fresh prunes and prune juice, milk of magnesia, or MiraLAX may facilitate passage of bowel movement  A stool softener and plenty of fluids may help to prevent constipation    Keflex antibiotic starts in the morning  Contact the urgent care to follow-up on the urine culture that was sent as the urine culture from today may not be sensitive as you are already on antibiotic when the specimen was provided    Contact your primary care doctor in the morning to arrange close follow-up    If your symptoms worsen, go to the hospital emergency department

## 2024-01-08 NOTE — ED PROVIDER NOTES
Patient Seen in: [facility] Emergency Department      History     Chief Complaint   Patient presents with    Abdomen/Flank Pain    Nausea/Vomiting/Diarrhea     Stated Complaint: abd pain, seen at PED yesterday for same    Subjective:   HPI    38-year-old female status post kidney transplant presents today for evaluation.  On Saturday, patient began having some suprapubic abdominal discomfort.  She was evaluated in immediate care.  She gets frequent UTIs and had a urine specimen performed.  She was ultimately placed on Macrobid.  Yesterday, patient's pain worsened.  She was evaluated in one of our emergency departments.  She had a CT scan that raise concern for cystitis.  She was transitioned to cephalexin and discharged home.  Pain intensified today.  Patient denies any fevers, vomiting or diarrhea.  She now returns for evaluation.    Objective:   Past Medical History:   Diagnosis Date    ANEMIA     CKD (chronic kidney disease) 7/7/2012    Endodermal sinus tumor 1985    Chemo, radiation    Neurogenic bladder 1985    self-cath    Osteoporosis     RENAL DISEASE 1995    Chronic renal failure, recuurent uti    RENAL DISEASE     neurogenic bladder, self cath q2hrs.              Past Surgical History:   Procedure Laterality Date    CHOLECYSTECTOMY  2011    TRANSPLANTATION OF KIDNEY  2013                Social History     Socioeconomic History    Marital status:     Number of children: 1   Tobacco Use    Smoking status: Never    Smokeless tobacco: Never   Substance and Sexual Activity    Alcohol use: No    Drug use: No    Sexual activity: Yes     Partners: Male     Comment: no contraception - but not able to get pregnant, pt has never had a period - adopted a child              Review of Systems    Positive for stated complaint: abd pain, seen at PED yesterday for same  Other systems are as noted in HPI.  Constitutional and vital signs reviewed.      All other systems reviewed and negative except as noted  above.    Physical Exam     ED Triage Vitals [01/08/24 1517]   /74   Pulse (!) 125   Resp 16   Temp 99.2 °F (37.3 °C)   Temp src Temporal   SpO2 95 %   O2 Device None (Room air)       Current:/76   Pulse 102   Temp 98.8 °F (37.1 °C) (Oral)   Resp 20   Ht 157.5 cm (5' 2\")   Wt 45.4 kg   SpO2 98%   BMI 18.29 kg/m²         Physical Exam  Vitals and nursing note reviewed.   Constitutional:       Appearance: Normal appearance.   HENT:      Head: Normocephalic.      Nose: Nose normal.      Mouth/Throat:      Mouth: Mucous membranes are moist.   Eyes:      Extraocular Movements: Extraocular movements intact.   Cardiovascular:      Rate and Rhythm: Regular rhythm. Tachycardia present.   Pulmonary:      Effort: Pulmonary effort is normal.   Abdominal:      General: Abdomen is flat.      Tenderness: There is generalized abdominal tenderness. There is guarding. There is no rebound.   Musculoskeletal:         General: Normal range of motion.   Skin:     General: Skin is warm.   Neurological:      General: No focal deficit present.      Mental Status: She is alert.   Psychiatric:         Mood and Affect: Mood normal.           ED Course     Labs Reviewed   COMP METABOLIC PANEL (14) - Abnormal; Notable for the following components:       Result Value    Glucose 114 (*)     Sodium 135 (*)     BUN 25 (*)     Creatinine 1.78 (*)     Calcium, Total 10.3 (*)     eGFR-Cr 37 (*)      (*)      (*)     Alkaline Phosphatase 186 (*)     A/G Ratio 0.9 (*)     All other components within normal limits   CBC W/ DIFFERENTIAL - Abnormal; Notable for the following components:    WBC 26.4 (*)     HGB 11.6 (*)     Neutrophil Absolute Prelim 21.55 (*)     Neutrophil Absolute 21.55 (*)     Monocyte Absolute 2.65 (*)     All other components within normal limits   LIPASE - Normal   LACTIC ACID, PLASMA - Normal   POCT PREGNANCY URINE - Normal   CBC WITH DIFFERENTIAL WITH PLATELET    Narrative:     The following orders  were created for panel order CBC With Differential With Platelet.  Procedure                               Abnormality         Status                     ---------                               -----------         ------                     CBC W/ DIFFERENTIAL[[id number]]          Abnormal            Final result                 Please view results for these tests on the individual orders.   SCAN SLIDE   TYPE AND SCREEN    Narrative:     The following orders were created for panel order Type and screen.  Procedure                               Abnormality         Status                     ---------                               -----------         ------                     ABORH (Blood Type)[[id number]]                               Final result               Antibody Screen[[id number]]                                  Final result                 Please view results for these tests on the individual orders.   ABORH (BLOOD TYPE)   ANTIBODY SCREEN   BLOOD CULTURE   BLOOD CULTURE             CT ABDOMEN+PELVIS(CPT=74176)    Result Date: 1/8/2024  PROCEDURE:  CT ABDOMEN+PELVIS (CPT=74176)  COMPARISON:  [facility], CT, CT ABDOMEN+PELVIS KIDNEYSTONE 2D RNDR(NO IV,NO ORAL)(CPT=74176), 1/07/2024, 7:11 PM.  INDICATIONS:  abd pain, seen at PED yesterday for same  TECHNIQUE:  Unenhanced multislice CT scanning was performed from the dome of the diaphragm to the pubic symphysis.  Dose reduction techniques were used. Dose information is transmitted to the ACR (American College of Radiology) NRDR (National Radiology Data Registry) which includes the Dose Index Registry.  PATIENT STATED HISTORY: (As transcribed by Technologist)  Right lower abd pain. Hx of frequent utis    FINDINGS:  LIVER:  No enlargement, atrophy, abnormal density, or significant focal lesion.  BILIARY:  There has been a previous cholecystectomy. PANCREAS:  No lesion, fluid collection, ductal dilatation, or atrophy.  SPLEEN:  No enlargement or focal lesion.   KIDNEYS:  Native kidneys have been removed.  There is a left lower quadrant renal transplant noted.  Renal transplant is unchanged in appearance since the recent prior study. ADRENALS:  No mass or enlargement.  AORTA/VASCULAR:    Unremarkable as seen on non-contrast imaging. RETROPERITONEUM:  No mass or adenopathy.  BOWEL/MESENTERY:  Markedly abnormal loop of small bowel is noted in right lower quadrant.  There is feculent debris within this distended loop of small bowel.  There is extensive stranding in the adjacent mesentery.  The finding has progressed significantly since previous study.  This could represent severe focal enteritis.  Possibility of ischemia of the short segment of bowel is raised by progression of findings.  Correlation with clinical history is necessary.  There is no specific evidence  of extraluminal air or drainable fluid collection within the limits of a noncontrast study. ABDOMINAL WALL:  No mass or hernia.  URINARY BLADDER:  There is moderate distention of urinary bladder.  Mild stranding around bladder is noted.  Correlation with any clinical evidence of cystitis is necessary. PELVIC NODES:  No adenopathy.  PELVIC ORGANS:  Uterus is not visualized and may have been removed. BONES:  Advanced degeneration right hip is noted with large subchondral cysts and subchondral sclerosis noted.  Diffuse mixed sclerotic and lucent appearance of all bony structures is noted and could represent chronic renal osteodystrophy. LUNG BASES:  There is reticular nodular tree-in-bud type appearance diffusely in lower lobes which could represent diffuse endobronchial infection or mucous plugging and multiple small airways. OTHER:  Negative.             CONCLUSION:  1. Progressive inflammatory change around the distal loop of ileum in right lower quadrant which is now markedly distended and contains feculent debris.  There is extensive stranding around this loop of bowel now.  There is no significant upstream  dilatation of bowel loops.  This could represent worsening of severe inflammatory enteritis although ischemic enteritis would not be excluded.  There is no free air or drainable fluid collection detected.  A normal appendix is not visualized. 2. There is stranding around urinary bladder which could indicate cystitis.  This finding is unchanged. 3. This patient has had bilateral nephrectomies and there is a transplanted kidney in the left lower quadrant. 4. End-stage degeneration right hip is noted. 5. Diffuse sclerotic appearance of all bony structures likely indicates chronic renal osteodystrophy.    LOCATION:  [facility]   Dictated by (CST): Dave Santiago MD on 1/08/2024 at 6:36 PM     Finalized by (CST): Dave Santiago MD on 1/08/2024 at 6:52 PM       CT ABDOMEN+PELVIS KIDNEYSTONE 2D RNDR(NO IV,NO ORAL)(CPT=74176)    Result Date: 1/7/2024  PROCEDURE:  CT ABDOMEN+PELVIS KIDNEYSTONE 2D RNDR(NO IV,NO ORAL)(CPT=74176)  COMPARISON:  None.  INDICATIONS:  ABD pain  TECHNIQUE:  Unenhanced multislice CT scanning from above the kidneys to below the urinary bladder.  2D rendering are generated on the CT scanner workstation to localize potential stones in the cranio-caudal plane.  Dose reduction techniques were used. Dose information is transmitted to the ACR (American College of Radiology) NRDR (National Radiology Data Registry) which includes the Dose Index Registry.  PATIENT STATED HISTORY: (As transcribed by Technologist)  Abdominal pain for few days; no vomiting    FINDINGS:  KIDNEYS:  Bilateral nephrectomy.  Normal morphology of left lower quadrant renal transplant without evidence of transplant obstruction. BLADDER:  Moderate concentric thickening of the urinary bladder indicating cystitis.  No intraluminal calculi. ADRENALS:  No mass or enlargement.  LIVER:  No enlargement, atrophy, abnormal density, or significant focal lesion.  BILIARY:  Cholecystectomy with expected prominence of the common bile duct. PANCREAS:  No  lesion, fluid collection, ductal dilatation, or atrophy.  SPLEEN:  No enlargement or focal lesion.  AORTA/VASCULAR:  No aneurysm.  RETROPERITONEUM:  No mass or adenopathy.  BOWEL/MESENTERY:  Mild fluid distention of distal small bowel loops with associated edema/congestion of the small bowel mesentery suggesting infectious/inflammatory enteritis.  No evidence of bowel obstruction.  The appendix is not definitively visualized.  Large stool volume noted diffusely throughout the colon. ABDOMINAL WALL:  No mass or hernia.  BONES:  Advanced osteoarthritis of the right hip.  Mild height loss of multiple vertebral bodies, chronicity indeterminate. PELVIC ORGANS:  Uterus and ovaries are absent or atrophic. LUNG BASES:  Clustered tree-in-bud type opacities noted throughout the visualized right middle right lower lobes indicating infectious bronchiolitis or aspiration. OTHER:  Prominent asymmetry in size of the proximal thighs.            CONCLUSION:   1. Moderate concentric thickening of the urinary bladder indicating cystitis.  Recommend correlation with urinalysis.  2. Mild fluid distention of distal small bowel loops with associated edema/congestion of the small bowel mesentery suggesting infectious/inflammatory enteritis.  Correlate with lower GI symptoms.  3. Large stool volume noted throughout the colon.  4. Clustered tree-in-bud opacities of the visualized right middle and right lower lobes indicating infectious bronchiolitis or aspiration.     LOCATION:  [facility]   Dictated by (CST): Mike Dawson MD on 1/07/2024 at 7:49 PM     Finalized by (CST): Mike Dawson MD on 1/07/2024 at 7:55 PM               MDM      Differential Diagnosis  38-year-old female on immunosuppressants for kidney transplant presents today for evaluation of persistent abdominal discomfort.  Patient denies any fevers.  On arrival, she is tachycardic.  She was recently diagnosed with a UTI and was transitioned from Macrobid to cephalexin.  A CT scan  yesterday demonstrated bladder wall thickening.  Patient's pain worsened today.  On exam, she localizes the pain to the right lower abdomen and it travels to the right upper abdomen.  The exam is limited as patient has significant discomfort and keeps her legs flexed at the hip.  She does have discomfort present in the left lower quadrant, epigastrium, right upper quadrant and right lower quadrant.  Within the differential would be sepsis, ascending urinary infection/pyelonephritis, appendicitis.  Plan for repeat imaging along with labs, will reassess.    6:54 pm  CT scan demonstrates progressive inflammation in the right lower quadrant.  The appendix was not visualized.  This could represent a severe enteritis although ischemic enteritis cannot be excluded.  With greater than 24 hours of symptoms and a normal lactic acid and in the absence of signs of bowel perforation on the CT, this may suggest against an ischemic event, although I can not definitively exclude the possibility.  In discussion with the radiologist, appendicitis is still possible as well.  I reviewed case with surgery Dr. Ovalle, she will assess patient and discuss possible ex lap.  I reviewed the findings with patient and .  Empiric antibiotics were given.  Will admit to the hospital for continued care, I spoke with the hospitalist regards to admission.  LFTs are elevated at this time which is of unclear significance presently.    External Chart Reviewed  I reviewed the ED note from yesterday    Discussions of Management  Case reviewed with the radiologist, the hospitalist along with the surgeon  Admission disposition: 1/8/2024  7:50 PM                                        Medical Decision Making      Disposition and Plan     Clinical Impression:  1. Sepsis due to undetermined organism (HCC)    2. Abdominal pain of unknown etiology         Disposition:  Admit  1/8/2024  7:50 pm    Follow-up:  No follow-up provider  specified.        Medications Prescribed:  Current Discharge Medication List                            Hospital Problems       Present on Admission  Date Reviewed: 1/4/2022            ICD-10-CM Noted POA    * (Principal) Sepsis due to undetermined organism (HCC) A41.9 1/8/2024 Unknown    Enteritis K52.9 1/8/2024 Unknown    Immunosuppressed status (HCC) D84.9 1/8/2024 Unknown    Sepsis (HCC) A41.9 1/8/2024 Unknown

## 2024-01-09 VITALS
WEIGHT: 100 LBS | SYSTOLIC BLOOD PRESSURE: 120 MMHG | HEIGHT: 62.01 IN | DIASTOLIC BLOOD PRESSURE: 72 MMHG | BODY MASS INDEX: 18.17 KG/M2 | TEMPERATURE: 98 F | HEART RATE: 88 BPM | RESPIRATION RATE: 18 BRPM | OXYGEN SATURATION: 98 %

## 2024-01-09 LAB
ALBUMIN SERPL-MCNC: 2.9 G/DL (ref 3.4–5)
ALBUMIN/GLOB SERPL: 0.8 {RATIO} (ref 1–2)
ALP LIVER SERPL-CCNC: 158 U/L
ALT SERPL-CCNC: 260 U/L
ANION GAP SERPL CALC-SCNC: 12 MMOL/L (ref 0–18)
AST SERPL-CCNC: 70 U/L (ref 15–37)
ATRIAL RATE: 105 BPM
BASOPHILS # BLD AUTO: 0.05 X10(3) UL (ref 0–0.2)
BASOPHILS NFR BLD AUTO: 0.2 %
BILIRUB SERPL-MCNC: 0.5 MG/DL (ref 0.1–2)
BUN BLD-MCNC: 27 MG/DL (ref 9–23)
CALCIUM BLD-MCNC: 9.8 MG/DL (ref 8.5–10.1)
CHLORIDE SERPL-SCNC: 111 MMOL/L (ref 98–112)
CO2 SERPL-SCNC: 16 MMOL/L (ref 21–32)
CREAT BLD-MCNC: 1.71 MG/DL
EGFRCR SERPLBLD CKD-EPI 2021: 38 ML/MIN/1.73M2 (ref 60–?)
EOSINOPHIL # BLD AUTO: 0 X10(3) UL (ref 0–0.7)
EOSINOPHIL NFR BLD AUTO: 0 %
ERYTHROCYTE [DISTWIDTH] IN BLOOD BY AUTOMATED COUNT: 13.8 %
GLOBULIN PLAS-MCNC: 3.8 G/DL (ref 2.8–4.4)
GLUCOSE BLD-MCNC: 148 MG/DL (ref 70–99)
HCT VFR BLD AUTO: 30.9 %
HGB BLD-MCNC: 9.8 G/DL
IMM GRANULOCYTES # BLD AUTO: 0.21 X10(3) UL (ref 0–1)
IMM GRANULOCYTES NFR BLD: 0.9 %
LYMPHOCYTES # BLD AUTO: 0.94 X10(3) UL (ref 1–4)
LYMPHOCYTES NFR BLD AUTO: 4 %
MCH RBC QN AUTO: 27.2 PG (ref 26–34)
MCHC RBC AUTO-ENTMCNC: 31.7 G/DL (ref 31–37)
MCV RBC AUTO: 85.8 FL
MONOCYTES # BLD AUTO: 0.94 X10(3) UL (ref 0.1–1)
MONOCYTES NFR BLD AUTO: 4 %
NEUTROPHILS # BLD AUTO: 21.18 X10 (3) UL (ref 1.5–7.7)
NEUTROPHILS # BLD AUTO: 21.18 X10(3) UL (ref 1.5–7.7)
NEUTROPHILS NFR BLD AUTO: 90.9 %
OSMOLALITY SERPL CALC.SUM OF ELEC: 296 MOSM/KG (ref 275–295)
P AXIS: 65 DEGREES
P-R INTERVAL: 138 MS
PLATELET # BLD AUTO: 308 10(3)UL (ref 150–450)
POTASSIUM SERPL-SCNC: 4.4 MMOL/L (ref 3.5–5.1)
PROT SERPL-MCNC: 6.7 G/DL (ref 6.4–8.2)
Q-T INTERVAL: 344 MS
QRS DURATION: 126 MS
QTC CALCULATION (BEZET): 454 MS
R AXIS: -38 DEGREES
RBC # BLD AUTO: 3.6 X10(6)UL
SODIUM SERPL-SCNC: 139 MMOL/L (ref 136–145)
T AXIS: 101 DEGREES
VENTRICULAR RATE: 105 BPM
WBC # BLD AUTO: 23.3 X10(3) UL (ref 4–11)

## 2024-01-09 PROCEDURE — 99223 1ST HOSP IP/OBS HIGH 75: CPT | Performed by: HOSPITALIST

## 2024-01-09 RX ORDER — ONDANSETRON 2 MG/ML
4 INJECTION INTRAMUSCULAR; INTRAVENOUS EVERY 6 HOURS PRN
Status: DISCONTINUED | OUTPATIENT
Start: 2024-01-09 | End: 2024-01-09

## 2024-01-09 RX ORDER — PROCHLORPERAZINE EDISYLATE 5 MG/ML
5 INJECTION INTRAMUSCULAR; INTRAVENOUS EVERY 8 HOURS PRN
Status: DISCONTINUED | OUTPATIENT
Start: 2024-01-09 | End: 2024-01-09

## 2024-01-09 RX ORDER — ONDANSETRON 2 MG/ML
4 INJECTION INTRAMUSCULAR; INTRAVENOUS EVERY 4 HOURS PRN
Status: ACTIVE | OUTPATIENT
Start: 2024-01-09 | End: 2024-01-09

## 2024-01-09 RX ORDER — ACETAMINOPHEN 500 MG
1000 TABLET ORAL EVERY 8 HOURS SCHEDULED
Status: DISCONTINUED | OUTPATIENT
Start: 2024-01-09 | End: 2024-01-09

## 2024-01-09 RX ORDER — HYDROMORPHONE HYDROCHLORIDE 1 MG/ML
0.8 INJECTION, SOLUTION INTRAMUSCULAR; INTRAVENOUS; SUBCUTANEOUS EVERY 2 HOUR PRN
Status: DISCONTINUED | OUTPATIENT
Start: 2024-01-09 | End: 2024-01-09

## 2024-01-09 RX ORDER — FAMOTIDINE 20 MG/1
20 TABLET, FILM COATED ORAL DAILY
Status: DISCONTINUED | OUTPATIENT
Start: 2024-01-09 | End: 2024-01-09

## 2024-01-09 RX ORDER — SENNOSIDES 8.6 MG
17.2 TABLET ORAL NIGHTLY PRN
Status: DISCONTINUED | OUTPATIENT
Start: 2024-01-09 | End: 2024-01-09

## 2024-01-09 RX ORDER — MYCOPHENOLIC ACID 180 MG/1
180 TABLET, DELAYED RELEASE ORAL
Status: DISCONTINUED | OUTPATIENT
Start: 2024-01-09 | End: 2024-01-09 | Stop reason: CLARIF

## 2024-01-09 RX ORDER — SODIUM CHLORIDE 9 MG/ML
INJECTION, SOLUTION INTRAVENOUS CONTINUOUS
Status: DISCONTINUED | OUTPATIENT
Start: 2024-01-09 | End: 2024-01-09

## 2024-01-09 RX ORDER — BISACODYL 10 MG
10 SUPPOSITORY, RECTAL RECTAL
Status: DISCONTINUED | OUTPATIENT
Start: 2024-01-09 | End: 2024-01-09

## 2024-01-09 RX ORDER — ENOXAPARIN SODIUM 100 MG/ML
40 INJECTION SUBCUTANEOUS DAILY
Status: DISCONTINUED | OUTPATIENT
Start: 2024-01-09 | End: 2024-01-09

## 2024-01-09 RX ORDER — MYCOPHENOLIC ACID 360 MG/1
360 TABLET, DELAYED RELEASE ORAL EVERY MORNING
Status: DISCONTINUED | OUTPATIENT
Start: 2024-01-09 | End: 2024-01-09

## 2024-01-09 RX ORDER — OXYCODONE HYDROCHLORIDE 5 MG/1
5 TABLET ORAL EVERY 4 HOURS PRN
Qty: 15 TABLET | Refills: 0 | Status: SHIPPED | OUTPATIENT
Start: 2024-01-09

## 2024-01-09 RX ORDER — TACROLIMUS 1 MG/1
2 CAPSULE ORAL 2 TIMES DAILY
Status: DISCONTINUED | OUTPATIENT
Start: 2024-01-09 | End: 2024-01-09 | Stop reason: CLARIF

## 2024-01-09 RX ORDER — POLYETHYLENE GLYCOL 3350 17 G/17G
17 POWDER, FOR SOLUTION ORAL DAILY PRN
Status: DISCONTINUED | OUTPATIENT
Start: 2024-01-09 | End: 2024-01-09

## 2024-01-09 RX ORDER — OXYCODONE HYDROCHLORIDE 5 MG/1
5 TABLET ORAL EVERY 4 HOURS PRN
Status: DISCONTINUED | OUTPATIENT
Start: 2024-01-09 | End: 2024-01-09

## 2024-01-09 RX ORDER — ENEMA 19; 7 G/133ML; G/133ML
1 ENEMA RECTAL ONCE AS NEEDED
Status: DISCONTINUED | OUTPATIENT
Start: 2024-01-09 | End: 2024-01-09

## 2024-01-09 RX ORDER — MYCOPHENOLIC ACID 180 MG/1
180 TABLET, DELAYED RELEASE ORAL
Status: DISCONTINUED | OUTPATIENT
Start: 2024-01-09 | End: 2024-01-09

## 2024-01-09 RX ORDER — OXYCODONE HYDROCHLORIDE 10 MG/1
10 TABLET ORAL EVERY 4 HOURS PRN
Status: DISCONTINUED | OUTPATIENT
Start: 2024-01-09 | End: 2024-01-09

## 2024-01-09 RX ORDER — FAMOTIDINE 10 MG/ML
20 INJECTION, SOLUTION INTRAVENOUS DAILY
Status: DISCONTINUED | OUTPATIENT
Start: 2024-01-09 | End: 2024-01-09

## 2024-01-09 RX ORDER — MELATONIN
3 NIGHTLY PRN
Status: DISCONTINUED | OUTPATIENT
Start: 2024-01-09 | End: 2024-01-09

## 2024-01-09 RX ORDER — AMOXICILLIN AND CLAVULANATE POTASSIUM 875; 125 MG/1; MG/1
1 TABLET, FILM COATED ORAL 2 TIMES DAILY
Qty: 14 TABLET | Refills: 0 | Status: SHIPPED | OUTPATIENT
Start: 2024-01-09 | End: 2024-01-16

## 2024-01-09 RX ORDER — HYDROMORPHONE HYDROCHLORIDE 1 MG/ML
INJECTION, SOLUTION INTRAMUSCULAR; INTRAVENOUS; SUBCUTANEOUS
Status: COMPLETED
Start: 2024-01-09 | End: 2024-01-09

## 2024-01-09 RX ORDER — HYDROMORPHONE HYDROCHLORIDE 1 MG/ML
0.4 INJECTION, SOLUTION INTRAMUSCULAR; INTRAVENOUS; SUBCUTANEOUS EVERY 2 HOUR PRN
Status: DISCONTINUED | OUTPATIENT
Start: 2024-01-09 | End: 2024-01-09

## 2024-01-09 RX ORDER — PHENAZOPYRIDINE HYDROCHLORIDE 200 MG/1
200 TABLET, FILM COATED ORAL 3 TIMES DAILY
COMMUNITY

## 2024-01-09 RX ORDER — NALOXONE HYDROCHLORIDE 4 MG/.1ML
4 SPRAY NASAL AS NEEDED
Qty: 1 KIT | Refills: 0 | Status: SHIPPED | OUTPATIENT
Start: 2024-01-09

## 2024-01-09 NOTE — ANESTHESIA PROCEDURE NOTES
Airway  Date/Time: 1/8/2024 9:58 PM  Urgency: elective      General Information and Staff    Patient location during procedure: OR  Anesthesiologist: Frank Steiner MD  Performed: anesthesiologist   Performed by: Frank Steiner MD  Authorized by: Frank Steiner MD      Indications and Patient Condition  Indications for airway management: anesthesia  Sedation level: deep  Preoxygenated: yes  Patient position: sniffing  Mask difficulty assessment: 1 - vent by mask    Final Airway Details  Final airway type: endotracheal airway      Successful airway: ETT  Cuffed: yes   Successful intubation technique: direct laryngoscopy  Endotracheal tube insertion site: oral  Blade size: #3  ETT size (mm): 7.0    Cormack-Lehane Classification: grade I - full view of glottis  Placement verified by: capnometry   Measured from: lips  Number of attempts at approach: 1

## 2024-01-09 NOTE — ANESTHESIA PROCEDURE NOTES
Peripheral IV  Date/Time: 1/8/2024 9:40 PM  Inserted by: Frank Steiner MD    Placement  Needle size: 18 G  Laterality: right  Location: forearm  Local anesthetic: none  Site prep: alcohol  Technique: anatomical landmarks  Attempts: 1

## 2024-01-09 NOTE — CONSULTS
[facility]  Report of Consultation    Nan Woodward Patient Status:  Emergency    1985 MRN [id number]   Location [facility] PRE OP HOLDING Attending Jasmin Ovalle MD   Hosp Day # 0 PCP No primary care provider on file.     Reason for Consultation:  Abdominal pain, seen at the request of the ER physician    History of Present Illness:  Nan Woodward is a 38 year old female with history of bilateral nephrectomies who has a neurogenic bladder, requiring self-catheterization.  The patient gets frequent urinary tract infections.  On Saturday, 2 days ago, she developed pelvic pain.  She felt it was a urinary tract infection and went to an urgent care center.  The urinalysis there reportedly showed blood, and she was started on antibiotic.  She continued to have pelvic pain and presented to the [facility] emergency room yesterday.  She had a CT scan that demonstrated concentric thickening of the urinary bladder indicating cystitis and fluid distention of small bowel loops with edema and congestion.  She was also noted to have constipation.  Her WBC was 17.8.  She was prescribed an antibiotic and discharge.  She did have a bout of emesis, after taking a Norco in the emergency room yesterday.  She has attempted to move her bowels without success.  The patient then had recurrent, worsening pelvic pain that radiated up to her lower abdomen.  Because of the worsening pain, she came to the emergency room where her WBC has now climbed to 26.4.  The CT scan demonstrates worsening enteritis, concerning for ischemia.  A lactic acid was checked and found to be 1.  The patient denies any fevers or chills.    Because of her worsening enteritis and escalating WBC with only a mild urinary tract infection by urinalysis, she was offered urgent diagnostic laparoscopy possible exploratory laparotomy, possible bowel resection.    The patient has a prior history of bilateral nephrectomies and has a  left-sided renal transplant.  She has a neurogenic bladder requiring self-catheterization.  She has also had a cholecystectomy in the past.    History:  Past Medical History:   Diagnosis Date    ANEMIA     CKD (chronic kidney disease) 7/7/2012    Endodermal sinus tumor 1985    Chemo, radiation    Neurogenic bladder 1985    self-cath    Osteoporosis     RENAL DISEASE 1995    Chronic renal failure, recuurent uti    RENAL DISEASE     neurogenic bladder, self cath q2hrs.       Past Surgical History:   Procedure Laterality Date    CHOLECYSTECTOMY  2011    TRANSPLANTATION OF KIDNEY  2013       Family History   Problem Relation Age of Onset    Heart Disease Maternal Grandfather     Heart Disease Paternal Grandmother     Cancer Paternal Grandfather         colon        reports that she has never smoked. She has never used smokeless tobacco. She reports that she does not drink alcohol and does not use drugs.    Allergies:  No Known Allergies    Medications:  Current Discharge Medication List        CONTINUE these medications which have NOT CHANGED    Details   nitrofurantoin monohydrate macro 100 MG Oral Cap Take 1 capsule (100 mg total) by mouth 2 (two) times daily.      cephalexin 500 MG Oral Cap Take 1 capsule (500 mg total) by mouth 4 (four) times daily for 10 days.  Qty: 40 capsule, Refills: 0      HYDROcodone-acetaminophen 5-325 MG Oral Tab Take 1-2 tablets by mouth every 6 (six) hours as needed for Pain.  Qty: 10 tablet, Refills: 0    Associated Diagnoses: Suprapubic pain, acute      ondansetron 8 MG Oral Tablet Dispersible Take 1 tablet (8 mg total) by mouth every 6 (six) hours as needed for Nausea.  Qty: 10 tablet, Refills: 0      tacrolimus (PROGRAF) 1 MG Oral Cap Take 2 capsules (2 mg total) by mouth 2 (two) times daily.      mycophenolate sodium (MYFORTIC) 360 MG Oral Tab EC Take 180 mg by mouth 2 (two) times daily before meals.             Review of Systems:    Allergic/Immuno:  Patient has no known  allergies.  Cardiovascular:  Negative for cool extremity and irregular heartbeat/palpitations  Constitutional:  Negative for decreased activity, fever, irritability and lethargy  Endocrine:  Negative for abnormal sleep patterns, increased activity, polydipsia and polyphagia  ENMT:  Negative for ear drainage, hearing loss and nasal drainage  Eyes:  Negative for eye discharge and vision loss  Gastrointestinal:  Positive for abdominal pain and vomiting  Genitourinary:  Positive for frequent UTI  Hema/Lymph:  Negative for easy bleeding and easy bruising  Integumentary:  Negative for pruritus and rash  Musculoskeletal:  Negative for bone/joint symptoms  Neurological:  Positive for neurogenic bladder  Psychiatric:  Negative for inappropriate interaction and psychiatric symptoms  Respiratory:  Negative for cough, dyspnea and wheezing      Physical Exam:  Blood pressure 105/76, pulse 102, temperature 98.8 °F (37.1 °C), temperature source Oral, resp. rate 20, height 62\", weight 100 lb (45.4 kg), SpO2 98%, not currently breastfeeding.    General: Alert, orientated x3.  Cooperative.  No apparent distress.  HEENT: Exam is unremarkable.  Without scleral icterus.  Mucous membranes are moist. Oropharynx is clear.  Neck: No tenderness to palpitation.  Full range of motion to flexion and extension, lateral rotation and lateral flexion of cervical spine.  No JVD. Supple.   Lungs: Clear to auscultation bilaterally.  Cardiac: Regular rate and rhythm. No murmur.  Abdomen:  Soft, non-distended, +suprapubic tenderness, with no rebound but + guarding.  No peritoneal signs. No ascites.  Liver is within normal limits.  Spleen is not palpable.    Extremities:  No lower extremity edema noted.  Without clubbing or cyanosis.    Skin: Normal texture and turgor.  Lymphatic:  No palpable cervical lymphadenopathy.  Neurologic: Cranial nerves are grossly intact.  Motor strength and sensory examination is grossly normal.  No focal neurologic  deficit.    Laboratory Data:  Lab Results   Component Value Date    WBC 26.4 01/08/2024    RBC 4.25 01/08/2024    HGB 11.6 01/08/2024    HCT 36.3 01/08/2024    MCV 85.4 01/08/2024    MCH 27.3 01/08/2024    MCHC 32.0 01/08/2024    RDW 13.3 01/08/2024    .0 01/08/2024     No results found for: \"PT\", \"INR\"  Lab Results   Component Value Date     01/08/2024    K 4.4 01/08/2024     01/08/2024    CO2 24.0 01/08/2024    BUN 25 01/08/2024    CREATSERUM 1.78 01/08/2024     01/08/2024    CA 10.3 01/08/2024    ALKPHO 186 01/08/2024     01/08/2024     01/08/2024    BILT 0.8 01/08/2024    ALB 3.9 01/08/2024    TP 8.1 01/08/2024       CT ABD/PEL   1/8/2024   I personally reviewed the report.     FINDINGS:    LIVER:  No enlargement, atrophy, abnormal density, or significant focal lesion.    BILIARY:  There has been a previous cholecystectomy.  PANCREAS:  No lesion, fluid collection, ductal dilatation, or atrophy.    SPLEEN:  No enlargement or focal lesion.    KIDNEYS:  Native kidneys have been removed.  There is a left lower quadrant renal transplant noted.  Renal transplant is unchanged in appearance since the recent prior study.  ADRENALS:  No mass or enlargement.    AORTA/VASCULAR:    Unremarkable as seen on non-contrast imaging.  RETROPERITONEUM:  No mass or adenopathy.    BOWEL/MESENTERY:  Markedly abnormal loop of small bowel is noted in right lower quadrant.  There is feculent debris within this distended loop of small bowel.  There is extensive stranding in the adjacent mesentery.  The finding has progressed  significantly since previous study.  This could represent severe focal enteritis.  Possibility of ischemia of the short segment of bowel is raised by progression of findings.  Correlation with clinical history is necessary.  There is no specific evidence   of extraluminal air or drainable fluid collection within the limits of a noncontrast study.  ABDOMINAL WALL:  No mass or  hernia.    URINARY BLADDER:  There is moderate distention of urinary bladder.  Mild stranding around bladder is noted.  Correlation with any clinical evidence of cystitis is necessary.  PELVIC NODES:  No adenopathy.    PELVIC ORGANS:  Uterus is not visualized and may have been removed.  BONES:  Advanced degeneration right hip is noted with large subchondral cysts and subchondral sclerosis noted.  Diffuse mixed sclerotic and lucent appearance of all bony structures is noted and could represent chronic renal osteodystrophy.  LUNG BASES:  There is reticular nodular tree-in-bud type appearance diffusely in lower lobes which could represent diffuse endobronchial infection or mucous plugging and multiple small airways.  OTHER:  Negative.                     Impression   CONCLUSION:    1. Progressive inflammatory change around the distal loop of ileum in right lower quadrant which is now markedly distended and contains feculent debris.  There is extensive stranding around this loop of bowel now.  There is no significant upstream  dilatation of bowel loops.  This could represent worsening of severe inflammatory enteritis although ischemic enteritis would not be excluded.  There is no free air or drainable fluid collection detected.  A normal appendix is not visualized.  2. There is stranding around urinary bladder which could indicate cystitis.  This finding is unchanged.  3. This patient has had bilateral nephrectomies and there is a transplanted kidney in the left lower quadrant.  4. End-stage degeneration right hip is noted.  5. Diffuse sclerotic appearance of all bony structures likely indicates chronic renal osteodystrophy.          LOCATION:          Dictated by (CST): Dave Santiago MD on 1/08/2024         CT ABD/PEL    1/7/2024   I personally reviewed the report.     FINDINGS:    KIDNEYS:  Bilateral nephrectomy.  Normal morphology of left lower quadrant renal transplant without evidence of transplant  obstruction.  BLADDER:  Moderate concentric thickening of the urinary bladder indicating cystitis.  No intraluminal calculi.  ADRENALS:  No mass or enlargement.    LIVER:  No enlargement, atrophy, abnormal density, or significant focal lesion.    BILIARY:  Cholecystectomy with expected prominence of the common bile duct.  PANCREAS:  No lesion, fluid collection, ductal dilatation, or atrophy.    SPLEEN:  No enlargement or focal lesion.    AORTA/VASCULAR:  No aneurysm.    RETROPERITONEUM:  No mass or adenopathy.    BOWEL/MESENTERY:  Mild fluid distention of distal small bowel loops with associated edema/congestion of the small bowel mesentery suggesting infectious/inflammatory enteritis.  No evidence of bowel obstruction.  The appendix is not definitively  visualized.  Large stool volume noted diffusely throughout the colon.  ABDOMINAL WALL:  No mass or hernia.    BONES:  Advanced osteoarthritis of the right hip.  Mild height loss of multiple vertebral bodies, chronicity indeterminate.  PELVIC ORGANS:  Uterus and ovaries are absent or atrophic.  LUNG BASES:  Clustered tree-in-bud type opacities noted throughout the visualized right middle right lower lobes indicating infectious bronchiolitis or aspiration.  OTHER:  Prominent asymmetry in size of the proximal thighs.                   Impression   CONCLUSION:       1. Moderate concentric thickening of the urinary bladder indicating cystitis.  Recommend correlation with urinalysis.     2. Mild fluid distention of distal small bowel loops with associated edema/congestion of the small bowel mesentery suggesting infectious/inflammatory enteritis.  Correlate with lower GI symptoms.     3. Large stool volume noted throughout the colon.     4. Clustered tree-in-bud opacities of the visualized right middle and right lower lobes indicating infectious bronchiolitis or aspiration.             LOCATION:  [facility]        Dictated by (CST): Mike Dawson MD on 1/07/2024        Impression:  Patient Active Problem List   Diagnosis    CKD (chronic kidney disease)    Amenorrhea    S/P kidney transplant    Early menopause    Osteoporosis    Nausea vomiting and diarrhea    Leukocytosis, unspecified type    Renal insufficiency    ESRD (end stage renal disease) (HCC)    BRIANDA (acute kidney injury) (HCC)    Viral gastroenteritis    Sepsis (HCC)    Sepsis due to undetermined organism (HCC)       Plan:  I offered the patient diagnostic laparoscopy, possible exploratory laparotomy, possible bowel resection.  I discussed that I suspect she has appendicitis that has progressed.  Her CT scan and WBC are both worse, despite receiving Rocephin, which is a 24-hour antibiotic, as well as p.o. antibiotics at home.  The definitive treatment for appendicitis would be appendectomy.  We did discuss that she may have dense adhesions in the abdomen requiring an open procedure.  We also discussed that her kidney is on the left side and would need to avoid any injury.  Without surgery, she may progress to perforation and full sepsis.  The immunosuppressants she takes for her renal transplant may have suppressed the usual inflammatory response, which is why she has been afebrile.  Conservative treatment options would be IV fluids with IV antibiotics.  If she clinically worsens, we would then proceed to surgery, depending on her clinical picture at that time.  The patient and her  were offered multiple opportunities to ask questions, which were answered.  They were given time privately to discuss the surgical options.  After their discussion, the patient and her  agreed to proceed with surgery.  Discussed with Dr. Manjeet Ovalle MD  1/8/2024  9:13 PM    The 21st Century Cures Act makes medical notes like these available to patients in the interest of transparency. Please be advised this is a medical document. Medical documents are intended to carry relevant information, facts  as evident, and the clinical opinion of the practitioner. The medical note is intended as peer to peer communication and may appear blunt or direct. It is written in medical language and may contain abbreviations or verbiage that are unfamiliar.

## 2024-01-09 NOTE — ANESTHESIA POSTPROCEDURE EVALUATION
[facility]    Nan Woodward Patient Status:  Emergency   Age/Gender 38 year old female MRN [id number]   Location [facility] POST ANESTHESIA CARE UNIT Attending Jasmin Ovalle MD   Hosp Day # 0 PCP No primary care provider on file.       Anesthesia Post-op Note    Laparoscopic Appendectomy    Procedure Summary       Date: 01/08/24 Room / Location:  MAIN OR 08 / [facility] MAIN OR    Anesthesia Start: 2154 Anesthesia Stop: 2325    Procedure: Laparoscopic Appendectomy Diagnosis:       SBO (small bowel obstruction) (HCC)      (SBO (small bowel obstruction) (HCC) [K56.609])    Surgeons: Jasmin Ovalle MD Anesthesiologist: Frank Steiner MD    Anesthesia Type: general ASA Status: 3            Anesthesia Type: general    Vitals Value Taken Time   /73 01/08/24 2326   Temp 97.8 °F (36.6 °C) 01/08/24 2318   Pulse 128 01/08/24 2326   Resp 25 01/08/24 2326   SpO2 93 % 01/08/24 2326   Vitals shown include unfiled device data.    Patient Location: PACU    Anesthesia Type: general    Airway Patency: patent    Postop Pain Control: adequate    Mental Status: mildly sedated but able to meaningfully participate in the post-anesthesia evaluation    Nausea/Vomiting: none    Cardiopulmonary/Hydration status: stable euvolemic    Complications: no apparent anesthesia related complications    Postop vital signs: stable    Dental Exam: Unchanged from Preop    Patient to be discharged from PACU when criteria met.

## 2024-01-09 NOTE — PLAN OF CARE
NURSING DISCHARGE NOTE    Discharged Home via Wheelchair.  Accompanied by Family member  Belongings Taken by patient/family.    Discharge instruction given and explained. Patient and family verbalizing understanding. MD note printed and given to patient prior to discharge. PIV x2 removed. Steri strips to abdomen c/d/I. All questions answered.

## 2024-01-09 NOTE — PLAN OF CARE
Patient is  alert, interactive and cooperative with care  She denies chest pain and shortness of breath. No signs of distress noted  No signs of bleeding noted on her lap sites  Due meds given. Continue with IV antibiotics.PRN pain meds available  Use of incentive spirometry encouraged  Assisted w/ ambulation  Will advance diet as tolerated  Follow up labs ordered   Falls & safety precautions maintained  Problem: SKIN/TISSUE INTEGRITY - ADULT  Goal: Incision(s), wounds(s) or drain site(s) healing without S/S of infection  Description: INTERVENTIONS:  - Assess and document dressing/incision, wound bed,  and surrounding tissue  - Implement wound care per orders  Outcome: Progressing     Problem: PAIN - ADULT  Goal: Verbalizes/displays adequate comfort level or patient's stated pain goal  Description: INTERVENTIONS:  - Encourage pt to monitor pain and request assistance  - Assess pain using appropriate pain scale  - Administer analgesics based on type and severity of pain and evaluate response  - Implement non-pharmacological measures as appropriate and evaluate response  - Consider cultural and social influences on pain and pain management  - Manage/alleviate anxiety  - Utilize distraction and/or relaxation techniques  - Monitor for opioid side effects  - Notify MD/LIP if interventions unsuccessful or patient reports new pain  - Anticipate increased pain with activity and pre-medicate as appropriate  Outcome: Progressing     Problem: GASTROINTESTINAL - ADULT  Goal: Minimal or absence of nausea and vomiting  Description: INTERVENTIONS:  - Maintain adequate hydration with IV or PO as ordered and tolerated  - Nasogastric tube to low intermittent suction as ordered  - Evaluate effectiveness of ordered antiemetic medications  - Provide nonpharmacologic comfort measures as appropriate  - Advance diet as tolerated, if ordered  - Obtain nutritional consult as needed  - Evaluate fluid balance  Outcome: Progressing  Goal:  Maintains or returns to baseline bowel function  Description: INTERVENTIONS:  - Assess bowel function  - Maintain adequate hydration with IV or PO as ordered and tolerated  - Evaluate effectiveness of GI medications  - Encourage mobilization and activity  - Obtain nutritional consult as needed  - Establish a toileting routine/schedule  - Consider collaborating with pharmacy to review patient's medication profile  Outcome: Progressing  Goal: Maintains adequate nutritional intake (undernourished)  Description: INTERVENTIONS:  - Monitor percentage of each meal consumed  - Identify factors contributing to decreased intake, treat as appropriate  - Assist with meals as needed  - Monitor I&O, WT and lab values  - Obtain nutritional consult as needed  - Optimize oral hygiene and moisture  - Encourage food from home; allow for food preferences  - Enhance eating environment  Outcome: Progressing     Problem: METABOLIC/FLUID AND ELECTROLYTES - ADULT  Goal: Electrolytes maintained within normal limits  Description: INTERVENTIONS:  - Monitor labs and rhythm and assess patient for signs and symptoms of electrolyte imbalances  - Administer electrolyte replacement as ordered  - Monitor response to electrolyte replacements, including rhythm and repeat lab results as appropriate  - Fluid restriction as ordered  - Instruct patient on fluid and nutrition restrictions as appropriate  Outcome: Progressing  Goal: Hemodynamic stability and optimal renal function maintained  Description: INTERVENTIONS:  - Monitor labs and assess for signs and symptoms of volume excess or deficit  - Monitor intake, output and patient weight  - Monitor urine specific gravity, serum osmolarity and serum sodium as indicated or ordered  - Monitor response to interventions for patient's volume status, including labs, urine output, blood pressure (other measures as available)  - Encourage oral intake as appropriate  - Instruct patient on fluid and nutrition  restrictions as appropriate  Outcome: Progressing     Problem: Patient/Family Goals  Goal: Patient/Family Long Term Goal  Description: Goal: Patient/Family Long Term Goal  Description: Patient's Long Term Goal: stay healthy at home    Interventions:  -SURGERY CONSULT  -advance diet as tolerated  -ambulate  -use IS  -take medications as prescribed  -attend follow up appointments as recommended  -diet and exercise as advised  -report new or worsening symptoms to physician  Outcome: Progressing  Goal: Patient/Family Short Term Goal  Description: Patient's Short Term Goal:     1/8 NOC \" no pain\"     Interventions:   - monitor for complaints of pain  -offer PRN pain meds  Outcome: Progressing

## 2024-01-09 NOTE — PROGRESS NOTES
[facility]  Progress Note    Nan Woodward Patient Status:  Inpatient    1985 MRN [id number]   Location [facility] 0SW-A Attending Erlin Shultz,    Hosp Day # 0 PCP No primary care provider on file.     Subjective:  The patient feels much better than yesterday.  Her pain is different than before surgery.  She has soreness mostly at her incision sites.  She is tolerating a soft diet.    Objective/Physical Exam:  /72 (BP Location: Right arm)   Pulse 88   Temp 98.2 °F (36.8 °C) (Oral)   Resp 18   Ht 62.01\"   Wt 100 lb (45.4 kg)   SpO2 98%   BMI 18.29 kg/m²     Intake/Output Summary (Last 24 hours) at 2024 0853  Last data filed at 2024 0630  Gross per 24 hour   Intake 3555 ml   Output 435 ml   Net 3120 ml         General: Alert, orientated x3.  Cooperative.  No apparent distress.  Abdomen:  Soft, non-distended, minimal incisional tenderness, with no rebound or guarding.  No peritoneal signs.  Incisions:  Clean, dry, intact without erythema.      Labs:  Lab Results   Component Value Date    WBC 23.3 2024    HGB 9.8 2024    HCT 30.9 2024    .0 2024      No results found for: \"PT\", \"INR\"  Lab Results   Component Value Date     2024    K 4.4 2024     2024    CO2 16.0 2024    BUN 27 2024    CREATSERUM 1.71 2024     2024    CA 9.8 2024    ALKPHO 158 2024     2024    AST 70 2024    BILT 0.5 2024    ALB 2.9 2024    TP 6.7 2024          Assessment:  POD #1 lap appy    Plan:  Okay to DC home from surgical standpoint  We will send home with 7 days of Augmentin  Follow-up in 2 weeks in the office    Jasmin Ovalle MD  2024  8:53 AM

## 2024-01-09 NOTE — DISCHARGE INSTRUCTIONS
Home Care Instructions  Appendectomy  Dr. Jasmin Ovalle    MEDICATIONS  For post-operative pain control the medications are oxycodone.  This is a narcotic and is best taken by starting with one tablet and repeating every four to six hours as needed.  If the patient does not feel they need the narcotics, they shouldn’t take them.  If the pain is severe, the patient may take up to two pills every four hours.  If a minor supplement is necessary in addition to the narcotics, please supplement with ibuprofen (Advil or Motrin).  Continue to take 1000 mg of acetaminophen (2 tablets of Extra Strength Tylenol) every 8 hours as a scheduled medication.  All other home medications may be resumed as scheduled.  With severe appendicitis, antibiotics will also be prescribed.  With antibiotics, please watch for rash, facial swelling or severe diarrhea.    DIET  The patient may resume a general diet immediately.  This is not a good time to eat excessively.  The patient should eat in moderation and stick with foods the patient feels are easy to digest.  There should be no alcohol consumption in the immediate recover time period or within six hours of taking narcotics.    WOUND CARE  The top dressing may be removed the day after surgery.  This includes the gauze, tape and band-aids if they are present.  Do not remove the steri-strips or butterfly tapes that are white and adherent to the skin.  The steri-strips will eventually peel up at the ends and at this point they may be removed.  This is usually seven to ten days after surgery.  The patient may shower the day after surgery.  There is no need to cover the incisions and all top gauze type dressings should be removed prior to showering.  Soap can get on the wounds but do not scrub over the wounds.  No hair dye or chemicals of any kind should get in the wounds.  Avoid tub baths for two weeks.  If visible sutures or staples are present they will be removed in the office by the  surgeon or nurse.  Most wounds will be closed with dissolving suture underneath the skin.  These sutures will dissolve on their own.    ACTIVITY  Every day the patient should be up, showered and dressed.  Each day the patient should be up and around the house.  The patient should not lie in bed and should not stay in pajamas.  We count on the patient being up, coughing, walking and deep breathing to avoid pneumonia and blood clots in the legs.  Once a day the patient should get out of the house and go shopping, go to the mall, the [employer] store, the [employer] or a restaurant.  The patient may ride in a car but should not drive the car for at least one week.  Patients should be off narcotics for at least 8 hours prior to driving.  The average time off work is 5 to 7 days; most adults will be seeing the surgeon or PA prior to returning to work.  Students will return to school within 1-5 days after discharge from the hospital but will be off gym, sports, and indoors for recess for one month.  Patients may go up and down stairs and lift up to five pounds but no bending, pushing or pulling.  Patients should do nothing called work or exercise until the follow up visit.  Patients should seek further activity limits at the time of their appointment.    APPOINTMENT  Please call our office for an appointment within seven to ten days of discharge.  Any fever greater than 100.5, chills, nausea, vomiting, or severe diarrhea please call our office.  If the wound turns red, hot, swollen, becomes increasingly painful, or drains pus call us immediately at [phone].  For life threatening emergencies call 911.  For non-emergent care please call our office after 8:30 a.m. Monday through Friday.  The number listed above is our office number.  Our phone automatically switches to our answering service if we are not there.  Please do not use the emergency room for non-urgent care.    Thank you for entrusting us with your  care.  [facility]--General Surgery

## 2024-01-09 NOTE — H&P
[facility]     Nan Woodward Patient Status:  Inpatient    1985 MRN [id number]   Location [facility] 0SW-A Attending Jasmin Ovalle MD   Hosp Day # 0 PCP No primary care provider on file.     Chief Complaint: abdominal pain    Subjective:    History of Present Illness:     Nan Woodward is a 38 year old female with hx of renal transplant in  on immunosupression, neurogenic bladder and self caths present to the ED with suprapubic/abdominal pain.  Patient had gone to the immediate care had a urine done and was placed on Macrobid however yesterday patient's pain worsened and came to the emergency room.  CT scan showed concern for cystitis and was switched to Keflex and discharged home today the pain intensified despite being on the antibiotics.  Patient denies any fevers, chills, vomiting, diarrhea or constipation.  No hematuria, hematochezia, melena.  No dizziness, lightheadedness, or syncope.  No chest pain, shortness of breath, cough    History/Other:    Past Medical History:  Past Medical History:   Diagnosis Date    ANEMIA     CKD (chronic kidney disease) 2012    Endodermal sinus tumor     Chemo, radiation    Neurogenic bladder     self-cath    Osteoporosis     RENAL DISEASE     Chronic renal failure, recuurent uti    RENAL DISEASE     neurogenic bladder, self cath q2hrs.     Past Surgical History:   Past Surgical History:   Procedure Laterality Date    CHOLECYSTECTOMY      TRANSPLANTATION OF KIDNEY        Family History:   Family History   Problem Relation Age of Onset    Heart Disease Maternal Grandfather     Heart Disease Paternal Grandmother     Cancer Paternal Grandfather         colon     Social History:    reports that she has never smoked. She has never used smokeless tobacco. She reports that she does not drink alcohol and does not use drugs.     Allergies: No Known Allergies    Medications:    Current  Facility-Administered Medications on File Prior to Encounter   Medication Dose Route Frequency Provider Last Rate Last Admin    [COMPLETED] HYDROmorphone (Dilaudid) 1 MG/ML injection 0.5 mg  0.5 mg Intravenous Q30 Min PRN Christiano Bronson MD   0.5 mg at 01/07/24 1945    [COMPLETED] ondansetron (Zofran) 4 MG/2ML injection 4 mg  4 mg Intravenous Q1H PRN Christiano Bronson MD   4 mg at 01/07/24 1945    [COMPLETED] sodium chloride 0.9 % IV bolus 1,000 mL  1,000 mL Intravenous Once Christiano Bronson MD   Stopped at 01/07/24 1900    [COMPLETED] phenazopyridine (Pyridum) tab 200 mg  200 mg Oral Once Christiano Bronson MD   200 mg at 01/07/24 1828    [COMPLETED] cefTRIAXone (Rocephin) 2 g in D5W 100 mL IVPB-ADD  2 g Intravenous Once Christiano Bronson MD   Stopped at 01/07/24 2012    [COMPLETED] HYDROcodone-acetaminophen (Norco) 5-325 MG per tab 1 tablet  1 tablet Oral Once Christiano Bronson MD   1 tablet at 01/07/24 2044     Current Outpatient Medications on File Prior to Encounter   Medication Sig Dispense Refill    phenazopyridine 200 MG Oral Tab Take 1 tablet (200 mg total) by mouth 3 (three) times daily.      HYDROcodone-acetaminophen 5-325 MG Oral Tab Take 1-2 tablets by mouth every 6 (six) hours as needed for Pain. 10 tablet 0    ondansetron 8 MG Oral Tablet Dispersible Take 1 tablet (8 mg total) by mouth every 6 (six) hours as needed for Nausea. 10 tablet 0    tacrolimus (PROGRAF) 1 MG Oral Cap Take 2 capsules (2 mg total) by mouth 2 (two) times daily.      mycophenolate sodium (MYFORTIC) 360 MG Oral Tab EC Take 180 mg by mouth 2 (two) times daily before meals.      nitrofurantoin monohydrate macro 100 MG Oral Cap Take 1 capsule (100 mg total) by mouth 2 (two) times daily.      cephalexin 500 MG Oral Cap Take 1 capsule (500 mg total) by mouth 4 (four) times daily for 10 days. 40 capsule 0       Review of Systems:   A comprehensive review of systems was completed.    Pertinent positives and negatives noted in the HPI.    Objective:    Physical Exam:    /71 (BP Location: Right arm)   Pulse 89   Temp 98.3 °F (36.8 °C) (Oral)   Resp 18   Ht 5' 2.01\" (1.575 m)   Wt 100 lb (45.4 kg)   SpO2 99%   BMI 18.29 kg/m²   General: No acute distress, Alert  Respiratory: No rhonchi, no wheezes  Cardiovascular: S1, S2. Regular rate and rhythm  Abdomen: Soft, Non-tender, non-distended, positive bowel sounds  Neuro: No new focal deficits  Extremities: No edema      Results:    Labs:      Labs Last 24 Hours:    Recent Labs   Lab 01/07/24  1755 01/08/24  1534   RBC 4.19 4.25   HGB 11.5* 11.6*   HCT 35.5 36.3   MCV 84.7 85.4   MCH 27.4 27.3   MCHC 32.4 32.0   RDW 13.4 13.3   NEPRELIM 12.90* 21.55*   WBC 17.8* 26.4*   .0 408.0       Recent Labs   Lab 01/07/24  1755 01/08/24  1534   * 114*   BUN 34* 25*   CREATSERUM 1.63* 1.78*   EGFRCR 41* 37*   CA 10.2* 10.3*   ALB 4.3 3.9   * 135*   K 4.6 4.4    104   CO2 23.0 24.0   ALKPHO 143* 186*   AST 18 190*   ALT 33 447*   BILT 0.5 0.8   TP 8.2 8.1       No results found for: \"PT\", \"INR\"    No results for input(s): \"TROP\", \"TROPHS\", \"CK\" in the last 168 hours.    No results for input(s): \"TROP\", \"PBNP\" in the last 168 hours.    No results for input(s): \"PCT\" in the last 168 hours.    Imaging: Imaging data reviewed in Epic.    Assessment & Plan:      # Sepsis   - Tachycardia, leukocytosis   - Cultures pending   - Continue broad spectrum abx    # Perforated appendix   - S/P laparoscopic appendectomy   -Management per general surgery.   - Continue abx    # Hx kindey transplant   -Patient continued on mycophenolate and tacrolimus.    # Leukocytosis   -Secondary to perforated appendix.   -Will monitor white blood cell count.    # Acute on CKD stage 3   - Baseline GFR in in the 50's.   - Continue IVF.   - BMP in the am.      Plan of care discussed with patient at bedside.    John Paul Hernandez, DO    Supplementary Documentation:     The 21st Century Cures Act makes medical notes like  these available to patients in the interest of transparency. Please be advised this is a medical document. Medical documents are intended to carry relevant information, facts as evident, and the clinical opinion of the practitioner. The medical note is intended as peer to peer communication and may appear blunt or direct. It is written in medical language and may contain abbreviations or verbiage that are unfamiliar.

## 2024-01-09 NOTE — OPERATIVE REPORT
[facility]  Op Note    Nan Woodward Location: OR   [facility] [id number] MRN [id number]   Admission Date 1/8/2024 Operation Date 1/8/2024   Attending Physician Jasmin Ovalle MD Operating Physician Jasmin Ovalle MD   DATE OF OPERATION:  1/8/2024  PREOPERATIVE DIAGNOSIS: Severe enteritis  POSTOPERATIVE DIAGNOSIS: Acute perforated appendicitis   PROCEDURE PERFORMED: Diagnostic laparoscopy, laparoscopic appendectomy.   SURGEON:  Jasmin Ovalle MD  ANESTHESIA: General.   SPECIMEN: Appendix to Pathology.   BLOOD LOSS: 5 mL.   COMPLICATIONS: None.   INDICATION FOR OPERATION: The patient is a 38-year-old female who presented to the emergency room with worsening lower abdominal pain.  The patient was seen yesterday where CT scan showed enteritis and cystitis.  She was prescribed antibiotics but returned with worsening symptoms.  Repeat CT scan showed worsening enteritis.  She also had an escalating white count.  She was offered urgent diagnostic laparoscopy, possible exploratory laparotomy, possible bowel resection.  The risks, benefits, and alternatives of this procedure were discussed in detail with the patient.  Risks include, but are not limited to, bleeding, wound infection, injury to other intraabdominal contents, and the possibility that this is not appendicitis.  The patient was agreeable to proceed with the operation.   OPERATIVE TECHNIQUE: After informed consent was obtained, the patient was taken to the operating room and placed in the supine position. General anesthesia was induced, and the abdomen was prepped and draped in usual sterile fashion.  The patient was placed head up, right side up.  A previous laparoscopic incision in the right upper quadrant was entered with an 11 blade scalpel.  The Veress needle was passed into the abdomen, and pneumoperitoneum was instituted to a pressure of 15 without difficulty. The Veress needle was then withdrawn, and a 5-mm trocar was passed through this  incision site. The abdomen was inspected and found to be grossly normal.  There is some scant adhesions in the lower abdomen.  The kidney could be seen bulging into the left lower abdomen, resting in the patient's flank outside of the true abdomen.  Under direct vision, a 5 mm port was placed through the lower midline scar.    The patient was then placed head down.  The small bowel was run and found to be severely inflamed, centered around the tip of the cecum.  Carefully, the loops of bowel were teased away from this area of inflammation.  The cecum was traced down to its end, and the appendix was identified.  It was found to be severely inflamed and at the center of the loops of bowel.  The lower midline trocar site was enlarged to a 12 mm port.  Another 5 mm port was placed in the left lower quadrant.  With careful traction, the appendix was delivered up into the abdomen, away from the loops of small bowel.  The mesoappendix was dissected free from the appendix itself. A fire of the LAVELLE stapler was used to transect the appendix at its base and the mesoappendix.  The appendix was then placed into an Endo Catch bag and withdrawn through the 12-mm port site.  Inspection of the right lower quadrant revealed further indurated tissue that was carefully bluntly dissected free from the surrounding tissue.  With further dissection, the tip of the appendix was actually discovered, remaining in the deep abdomen having perforated prior to the dissection beginning.  These bits of the appendix were then removed through the 12 mm port.  The abdomen was then copiously irrigated and suctioned until the irrigant returned clear and colorless. The staple line was inspected and found to be intact. The 12-mm port was then withdrawn under direct vision, the pneumoperitoneum was aspirated, and the remaining ports withdrawn. The fascia at the 12-mm port site was reapproximated with an 0-Maxon suture, and the skin at all 3 incisions was  reapproximated with subcuticular Vicryl suture. Half-percent Marcaine with epinephrine was injected into all the incisions to help with postoperative pain control. Steri-Strips and Mastisol were placed across the incisions, as well as sterile dressings. The patient tolerated the procedure well, was extubated in the OR, and went to the PACU in good condition.  FINDINGS: Acute, ruptured appendicitis without abscess  Jasmin Ovalle MD

## 2024-01-09 NOTE — ANESTHESIA PREPROCEDURE EVALUATION
PRE-OP EVALUATION    Patient Name: Nan Woodward    Admit Diagnosis: Sepsis due to undetermined organism ([facility]) [A41.9]    Pre-op Diagnosis: SBO (small bowel obstruction) ([facility]) [K56.609]    DIAGNOSTIC LAPAROSCOPY, POSSIBLE EXPLORATORY LAPAROTOMY, POSSIBLE BOWEL RESECTION    Anesthesia Procedure: DIAGNOSTIC LAPAROSCOPY, POSSIBLE EXPLORATORY LAPAROTOMY, POSSIBLE BOWEL RESECTION    Surgeon(s) and Role:     * Jasmin Ovalle MD - Primary    Pre-op vitals reviewed.  Temp: 98.8 °F (37.1 °C)  Pulse: 102  Resp: 20  BP: 105/76  SpO2: 98 %  Body mass index is 18.29 kg/m².    Current medications reviewed.  Hospital Medications:   [COMPLETED] sodium chloride 0.9 % IV bolus 1,000 mL  1,000 mL Intravenous Once    [COMPLETED] piperacillin-tazobactam (Zosyn) 3.375 g in dextrose 5% 100 mL IVPB-ADDV  3.375 g Intravenous Once    [COMPLETED] ondansetron (Zofran) 4 MG/2ML injection 4 mg  4 mg Intravenous Once    [COMPLETED] morphINE PF 4 MG/ML injection 4 mg  4 mg Intravenous Once    [COMPLETED] morphINE PF 4 MG/ML injection 4 mg  4 mg Intravenous Once    [COMPLETED] sodium chloride 0.9% infusion 1,000 mL  1,000 mL Intravenous Once       Outpatient Medications:     Medications Prior to Admission   Medication Sig Dispense Refill Last Dose    nitrofurantoin monohydrate macro 100 MG Oral Cap Take 1 capsule (100 mg total) by mouth 2 (two) times daily.       cephalexin 500 MG Oral Cap Take 1 capsule (500 mg total) by mouth 4 (four) times daily for 10 days. 40 capsule 0     HYDROcodone-acetaminophen 5-325 MG Oral Tab Take 1-2 tablets by mouth every 6 (six) hours as needed for Pain. 10 tablet 0     ondansetron 8 MG Oral Tablet Dispersible Take 1 tablet (8 mg total) by mouth every 6 (six) hours as needed for Nausea. 10 tablet 0     tacrolimus (PROGRAF) 1 MG Oral Cap Take 2 capsules (2 mg total) by mouth 2 (two) times daily.       mycophenolate sodium (MYFORTIC) 360 MG Oral Tab EC Take 180 mg by mouth 2 (two) times daily before meals.           Allergies: Patient has no known allergies.      Anesthesia Evaluation    Patient summary reviewed.    Anesthetic Complications           GI/Hepatic/Renal             (+) chronic renal disease and ESRD                   Cardiovascular    Negative cardiovascular ROS.                                                   Endo/Other    Negative endo/other ROS.                              Pulmonary    Negative pulmonary ROS.                       Neuro/Psych    Negative neuro/psych ROS.                          Patient Active Problem List:     CKD (chronic kidney disease)     Amenorrhea     S/P kidney transplant     Early menopause     Osteoporosis     Nausea vomiting and diarrhea     Leukocytosis, unspecified type     Renal insufficiency     ESRD (end stage renal disease) (HCC)     BRIANDA (acute kidney injury) (HCC)     Viral gastroenteritis     Sepsis (HCC)     Sepsis due to undetermined organism (HCC)           Past Surgical History:   Procedure Laterality Date    CHOLECYSTECTOMY  2011    TRANSPLANTATION OF KIDNEY  2013     Social History     Socioeconomic History    Marital status:     Number of children: 1   Tobacco Use    Smoking status: Never    Smokeless tobacco: Never   Substance and Sexual Activity    Alcohol use: No    Drug use: No    Sexual activity: Yes     Partners: Male     Comment: no contraception - but not able to get pregnant, pt has never had a period - adopted a child     History   Drug Use No     Available pre-op labs reviewed.  Lab Results   Component Value Date    WBC 26.4 (H) 01/08/2024    RBC 4.25 01/08/2024    HGB 11.6 (L) 01/08/2024    HCT 36.3 01/08/2024    MCV 85.4 01/08/2024    MCH 27.3 01/08/2024    MCHC 32.0 01/08/2024    RDW 13.3 01/08/2024    .0 01/08/2024     Lab Results   Component Value Date     (L) 01/08/2024    K 4.4 01/08/2024     01/08/2024    CO2 24.0 01/08/2024    BUN 25 (H) 01/08/2024    CREATSERUM 1.78 (H) 01/08/2024     (H) 01/08/2024    CA  10.3 (H) 01/08/2024            Airway      Mallampati: II  Mouth opening: >3 FB  TM distance: > 6 cm  Neck ROM: full Cardiovascular    Cardiovascular exam normal.         Dental    Dentition appears grossly intact         Pulmonary    Pulmonary exam normal.                 Other findings              ASA: 3   Plan: general  NPO status verified and patient meets guidelines.        Comment:  I explained intrinsic risks of general anesthesia, including nausea, dental damage, sore throat, mouth injury,and hoarseness from airway management.  All questions were answered and understanding was demonstrated of risks.  Informed permission was obtained to proceed as documented in the signed consent form.         Plan/risks discussed with: patient                Present on Admission:  **None**

## 2024-01-09 NOTE — PROGRESS NOTES
[facility] Transplant Medication Reconciliation Documentation    Nan Woodward is a 38 year old patient.    Organ transplanted and date of surgery: Kidney 2014               Specific Medications: Mycophenolate 360mg QAM, 180mg QPM, Tacrolimus 1.5mg BID  Verified via: Care Everywhere and Med Dispense Report      If confirmed via transplant or physician office:  Name of Contact:  Contact Number:  Hospital or Physician Practice Name:                       Thank you,  Don Padilla [facility]  1/9/2024   1:05 AM

## 2024-01-16 ENCOUNTER — OFFICE VISIT (OUTPATIENT)
Facility: LOCATION | Age: 39
End: 2024-01-16
Payer: COMMERCIAL

## 2024-01-16 ENCOUNTER — LAB ENCOUNTER (OUTPATIENT)
Dept: LAB | Age: 39
End: 2024-01-16
Payer: COMMERCIAL

## 2024-01-16 VITALS — HEART RATE: 84 BPM | TEMPERATURE: 97 F

## 2024-01-16 DIAGNOSIS — G89.18 POST-OP PAIN: ICD-10-CM

## 2024-01-16 DIAGNOSIS — Z87.448 HISTORY OF CHRONIC CYSTITIS: Primary | ICD-10-CM

## 2024-01-16 DIAGNOSIS — Z87.448 HISTORY OF CHRONIC CYSTITIS: ICD-10-CM

## 2024-01-16 LAB
BASOPHILS # BLD AUTO: 0.07 X10(3) UL (ref 0–0.2)
BASOPHILS NFR BLD AUTO: 0.6 %
BILIRUB UR QL STRIP.AUTO: NEGATIVE
CLARITY UR REFRACT.AUTO: CLEAR
COLOR UR AUTO: YELLOW
EOSINOPHIL # BLD AUTO: 0.31 X10(3) UL (ref 0–0.7)
EOSINOPHIL NFR BLD AUTO: 2.7 %
ERYTHROCYTE [DISTWIDTH] IN BLOOD BY AUTOMATED COUNT: 13.2 %
GLUCOSE UR STRIP.AUTO-MCNC: NORMAL MG/DL
HCT VFR BLD AUTO: 31.6 %
HGB BLD-MCNC: 9.7 G/DL
IMM GRANULOCYTES # BLD AUTO: 0.14 X10(3) UL (ref 0–1)
IMM GRANULOCYTES NFR BLD: 1.2 %
KETONES UR STRIP.AUTO-MCNC: NEGATIVE MG/DL
LEUKOCYTE ESTERASE UR QL STRIP.AUTO: NEGATIVE
LYMPHOCYTES # BLD AUTO: 2.86 X10(3) UL (ref 1–4)
LYMPHOCYTES NFR BLD AUTO: 24.8 %
MCH RBC QN AUTO: 26.9 PG (ref 26–34)
MCHC RBC AUTO-ENTMCNC: 30.7 G/DL (ref 31–37)
MCV RBC AUTO: 87.8 FL
MONOCYTES # BLD AUTO: 1.03 X10(3) UL (ref 0.1–1)
MONOCYTES NFR BLD AUTO: 8.9 %
NEUTROPHILS # BLD AUTO: 7.12 X10 (3) UL (ref 1.5–7.7)
NEUTROPHILS # BLD AUTO: 7.12 X10(3) UL (ref 1.5–7.7)
NEUTROPHILS NFR BLD AUTO: 61.8 %
NITRITE UR QL STRIP.AUTO: NEGATIVE
PH UR STRIP.AUTO: 6 [PH] (ref 5–8)
PLATELET # BLD AUTO: 584 10(3)UL (ref 150–450)
PROT UR STRIP.AUTO-MCNC: 30 MG/DL
RBC # BLD AUTO: 3.6 X10(6)UL
RBC UR QL AUTO: NEGATIVE
SP GR UR STRIP.AUTO: 1.01 (ref 1–1.03)
UROBILINOGEN UR STRIP.AUTO-MCNC: NORMAL MG/DL
WBC # BLD AUTO: 11.5 X10(3) UL (ref 4–11)

## 2024-01-16 PROCEDURE — 85025 COMPLETE CBC W/AUTO DIFF WBC: CPT

## 2024-01-16 PROCEDURE — 81001 URINALYSIS AUTO W/SCOPE: CPT

## 2024-01-16 PROCEDURE — 99024 POSTOP FOLLOW-UP VISIT: CPT

## 2024-01-16 NOTE — PAYOR COMM NOTE
--------------  ADMISSION REVIEW     Payor: [employer]  Subscriber #:  [id number]  Authorization Number: [id number]    Admit date: 1/9/24  Admit time: 12:35 AM       REVIEW DOCUMENTATION:     ED Provider Notes        Stated Complaint: abd pain, seen at PED yesterday for same    Subjective:   HPI    38-year-old female status post kidney transplant presents today for evaluation.  On Saturday, patient began having some suprapubic abdominal discomfort.  She was evaluated in immediate care.  She gets frequent UTIs and had a urine specimen performed.  She was ultimately placed on Macrobid.  Yesterday, patient's pain worsened.  She was evaluated in one of our emergency departments.  She had a CT scan that raise concern for cystitis.  She was transitioned to cephalexin and discharged home.  Pain intensified today.  Patient denies any fevers, vomiting or diarrhea.  She now returns for evaluation.      Physical Exam     ED Triage Vitals [01/08/24 1517]   /74   Pulse (!) 125   Resp 16   Temp 99.2 °F (37.3 °C)   Temp src Temporal   SpO2 95 %   O2 Device None (Room air)       Current:/76   Pulse 102   Temp 98.8 °F (37.1 °C) (Oral)   Resp 20   Ht 157.5 cm (5' 2\")   Wt 45.4 kg   SpO2 98%   BMI 18.29 kg/m²         Physical Exam  Vitals and nursing note reviewed.   Constitutional:       Appearance: Normal appearance.   HENT:      Head: Normocephalic.      Nose: Nose normal.      Mouth/Throat:      Mouth: Mucous membranes are moist.   Eyes:      Extraocular Movements: Extraocular movements intact.   Cardiovascular:      Rate and Rhythm: Regular rhythm. Tachycardia present.   Pulmonary:      Effort: Pulmonary effort is normal.   Abdominal:      General: Abdomen is flat.      Tenderness: There is generalized abdominal tenderness. There is guarding. There is no rebound.   Musculoskeletal:         General: Normal range of motion.   Skin:     General: Skin is warm.   Neurological:      General: No focal  deficit present.      Mental Status: She is alert.   Psychiatric:         Mood and Affect: Mood normal.           ED Course     Labs Reviewed   COMP METABOLIC PANEL (14) - Abnormal; Notable for the following components:       Result Value    Glucose 114 (*)     Sodium 135 (*)     BUN 25 (*)     Creatinine 1.78 (*)     Calcium, Total 10.3 (*)     eGFR-Cr 37 (*)      (*)      (*)     Alkaline Phosphatase 186 (*)     A/G Ratio 0.9 (*)     All other components within normal limits   CBC W/ DIFFERENTIAL - Abnormal; Notable for the following components:    WBC 26.4 (*)     HGB 11.6 (*)     Neutrophil Absolute Prelim 21.55 (*)     Neutrophil Absolute 21.55 (*)     Monocyte Absolute 2.65 (*)     All other components within normal limits   LIPASE - Normal   LACTIC ACID, PLASMA - Normal   POCT PREGNANCY URINE - Normal   CBC WITH DIFFERENTIAL WITH PLATELET      CT ABDOMEN+PELVIS(CPT=74176)    Result Date: 1/8/2024  PROCEDURE:  CT ABDOMEN+PELVIS (CPT=74176)  COMPARISON:  [facility], CT, CT ABDOMEN+PELVIS KIDNEYSTONE 2D RNDR(NO IV,NO ORAL)(CPT=74176), 1/07/2024, 7:11 PM.  INDICATIONS:  abd pain, seen at PED yesterday for same  TECHNIQUE:  Unenhanced multislice CT scanning was performed from the dome of the diaphragm to the pubic symphysis.  Dose reduction techniques were used. Dose information is transmitted to the ACR (American College of Radiology) NRDR (National Radiology Data Registry) which includes the Dose Index Registry.  PATIENT STATED HISTORY: (As transcribed by Technologist)  Right lower abd pain. Hx of frequent utis    FINDINGS:  LIVER:  No enlargement, atrophy, abnormal density, or significant focal lesion.  BILIARY:  There has been a previous cholecystectomy. PANCREAS:  No lesion, fluid collection, ductal dilatation, or atrophy.  SPLEEN:  No enlargement or focal lesion.  KIDNEYS:  Native kidneys have been removed.  There is a left lower quadrant renal transplant noted.  Renal transplant is unchanged  in appearance since the recent prior study. ADRENALS:  No mass or enlargement.  AORTA/VASCULAR:    Unremarkable as seen on non-contrast imaging. RETROPERITONEUM:  No mass or adenopathy.  BOWEL/MESENTERY:  Markedly abnormal loop of small bowel is noted in right lower quadrant.  There is feculent debris within this distended loop of small bowel.  There is extensive stranding in the adjacent mesentery.  The finding has progressed significantly since previous study.  This could represent severe focal enteritis.  Possibility of ischemia of the short segment of bowel is raised by progression of findings.  Correlation with clinical history is necessary.  There is no specific evidence  of extraluminal air or drainable fluid collection within the limits of a noncontrast study. ABDOMINAL WALL:  No mass or hernia.  URINARY BLADDER:  There is moderate distention of urinary bladder.  Mild stranding around bladder is noted.  Correlation with any clinical evidence of cystitis is necessary. PELVIC NODES:  No adenopathy.  PELVIC ORGANS:  Uterus is not visualized and may have been removed. BONES:  Advanced degeneration right hip is noted with large subchondral cysts and subchondral sclerosis noted.  Diffuse mixed sclerotic and lucent appearance of all bony structures is noted and could represent chronic renal osteodystrophy. LUNG BASES:  There is reticular nodular tree-in-bud type appearance diffusely in lower lobes which could represent diffuse endobronchial infection or mucous plugging and multiple small airways. OTHER:  Negative.             CONCLUSION:  1. Progressive inflammatory change around the distal loop of ileum in right lower quadrant which is now markedly distended and contains feculent debris.  There is extensive stranding around this loop of bowel now.  There is no significant upstream dilatation of bowel loops.  This could represent worsening of severe inflammatory enteritis although ischemic enteritis would not be  excluded.  There is no free air or drainable fluid collection detected.  A normal appendix is not visualized. 2. There is stranding around urinary bladder which could indicate cystitis.  This finding is unchanged. 3. This patient has had bilateral nephrectomies and there is a transplanted kidney in the left lower quadrant. 4. End-stage degeneration right hip is noted. 5. Diffuse sclerotic appearance of all bony structures likely indicates chronic renal osteodystrophy.    LOCATION:  [facility]   Dictated by (CST): Dave Santiago MD on 1/08/2024 at 6:36 PM     Finalized by (CST): Dave Santiago MD on 1/08/2024 at 6:52 PM       CT ABDOMEN+PELVIS KIDNEYSTONE 2D RNDR(NO IV,NO ORAL)(CPT=74176)    Result Date: 1/7/2024  PROCEDURE:  CT ABDOMEN+PELVIS KIDNEYSTONE 2D RNDR(NO IV,NO ORAL)(CPT=74176)  COMPARISON:  None.  INDICATIONS:  ABD pain  TECHNIQUE:  Unenhanced multislice CT scanning from above the kidneys to below the urinary bladder.  2D rendering are generated on the CT scanner workstation to localize potential stones in the cranio-caudal plane.  Dose reduction techniques were used. Dose information is transmitted to the ACR (American College of Radiology) NRDR (National Radiology Data Registry) which includes the Dose Index Registry.  PATIENT STATED HISTORY: (As transcribed by Technologist)  Abdominal pain for few days; no vomiting    FINDINGS:  KIDNEYS:  Bilateral nephrectomy.  Normal morphology of left lower quadrant renal transplant without evidence of transplant obstruction. BLADDER:  Moderate concentric thickening of the urinary bladder indicating cystitis.  No intraluminal calculi. ADRENALS:  No mass or enlargement.  LIVER:  No enlargement, atrophy, abnormal density, or significant focal lesion.  BILIARY:  Cholecystectomy with expected prominence of the common bile duct. PANCREAS:  No lesion, fluid collection, ductal dilatation, or atrophy.  SPLEEN:  No enlargement or focal lesion.  AORTA/VASCULAR:  No aneurysm.   RETROPERITONEUM:  No mass or adenopathy.  BOWEL/MESENTERY:  Mild fluid distention of distal small bowel loops with associated edema/congestion of the small bowel mesentery suggesting infectious/inflammatory enteritis.  No evidence of bowel obstruction.  The appendix is not definitively visualized.  Large stool volume noted diffusely throughout the colon. ABDOMINAL WALL:  No mass or hernia.  BONES:  Advanced osteoarthritis of the right hip.  Mild height loss of multiple vertebral bodies, chronicity indeterminate. PELVIC ORGANS:  Uterus and ovaries are absent or atrophic. LUNG BASES:  Clustered tree-in-bud type opacities noted throughout the visualized right middle right lower lobes indicating infectious bronchiolitis or aspiration. OTHER:  Prominent asymmetry in size of the proximal thighs.            CONCLUSION:   1. Moderate concentric thickening of the urinary bladder indicating cystitis.  Recommend correlation with urinalysis.  2. Mild fluid distention of distal small bowel loops with associated edema/congestion of the small bowel mesentery suggesting infectious/inflammatory enteritis.  Correlate with lower GI symptoms.  3. Large stool volume noted throughout the colon.  4. Clustered tree-in-bud opacities of the visualized right middle and right lower lobes indicating infectious bronchiolitis or aspiration.     LOCATION:  [facility]   Dictated by (CST): Mike Dawson MD on 1/07/2024 at 7:49 PM     Finalized by (CST): Mike Dawson MD on 1/07/2024 at 7:55 PM              MDM      Differential Diagnosis  38-year-old female on immunosuppressants for kidney transplant presents today for evaluation of persistent abdominal discomfort.  Patient denies any fevers.  On arrival, she is tachycardic.  She was recently diagnosed with a UTI and was transitioned from Macrobid to cephalexin.  A CT scan yesterday demonstrated bladder wall thickening.  Patient's pain worsened today.  On exam, she localizes the pain to the right lower  abdomen and it travels to the right upper abdomen.  The exam is limited as patient has significant discomfort and keeps her legs flexed at the hip.  She does have discomfort present in the left lower quadrant, epigastrium, right upper quadrant and right lower quadrant.  Within the differential would be sepsis, ascending urinary infection/pyelonephritis, appendicitis.  Plan for repeat imaging along with labs, will reassess.    6:54 pm  CT scan demonstrates progressive inflammation in the right lower quadrant.  The appendix was not visualized.  This could represent a severe enteritis although ischemic enteritis cannot be excluded.  With greater than 24 hours of symptoms and a normal lactic acid and in the absence of signs of bowel perforation on the CT, this may suggest against an ischemic event, although I can not definitively exclude the possibility.  In discussion with the radiologist, appendicitis is still possible as well.  I reviewed case with surgery Dr. Ovalle, she will assess patient and discuss possible ex lap.  I reviewed the findings with patient and .  Empiric antibiotics were given.  Will admit to the hospital for continued care, I spoke with the hospitalist regards to admission.  LFTs are elevated at this time which is of unclear significance presently.    External Chart Reviewed  I reviewed the ED note from yesterday    Discussions of Management  Case reviewed with the radiologist, the hospitalist along with the surgeon  Admission disposition: 2024  7:50 PM    Disposition and Plan     Clinical Impression:  1. Sepsis due to undetermined organism (HCC)    2. Abdominal pain of unknown etiology         Disposition:  Admit  2024  7:50 pm                  H&P - H&P Note          [facility]  [facility]     Nan M Trace Patient Status:  Inpatient    1985 MRN [id number]   Location [facility] 0SW-A Attending Jasmin Ovalle MD   Hosp Day # 0 PCP No primary  care provider on file.     Chief Complaint: abdominal pain    Subjective:    History of Present Illness:     Nan Woodward is a 38 year old female with hx of renal transplant in 2013 on immunosupression, neurogenic bladder and self caths present to the ED with suprapubic/abdominal pain.  Patient had gone to the immediate care had a urine done and was placed on Macrobid however yesterday patient's pain worsened and came to the emergency room.  CT scan showed concern for cystitis and was switched to Keflex and discharged home today the pain intensified despite being on the antibiotics.  Patient denies any fevers, chills, vomiting, diarrhea or constipation.  No hematuria, hematochezia, melena.  No dizziness, lightheadedness, or syncope.  No chest pain, shortness of breath, cough    Objective:   Physical Exam:    /71 (BP Location: Right arm)   Pulse 89   Temp 98.3 °F (36.8 °C) (Oral)   Resp 18   Ht 5' 2.01\" (1.575 m)   Wt 100 lb (45.4 kg)   SpO2 99%   BMI 18.29 kg/m²   General: No acute distress, Alert  Respiratory: No rhonchi, no wheezes  Cardiovascular: S1, S2. Regular rate and rhythm  Abdomen: Soft, Non-tender, non-distended, positive bowel sounds  Neuro: No new focal deficits  Extremities: No edema      Results:    Labs:      Labs Last 24 Hours:    Recent Labs   Lab 01/07/24  1755 01/08/24  1534   RBC 4.19 4.25   HGB 11.5* 11.6*   HCT 35.5 36.3   MCV 84.7 85.4   MCH 27.4 27.3   MCHC 32.4 32.0   RDW 13.4 13.3   NEPRELIM 12.90* 21.55*   WBC 17.8* 26.4*   .0 408.0       Recent Labs   Lab 01/07/24  1755 01/08/24  1534   * 114*   BUN 34* 25*   CREATSERUM 1.63* 1.78*   EGFRCR 41* 37*   CA 10.2* 10.3*   ALB 4.3 3.9   * 135*   K 4.6 4.4    104   CO2 23.0 24.0   ALKPHO 143* 186*   AST 18 190*   ALT 33 447*   BILT 0.5 0.8   TP 8.2 8.1       Assessment & Plan:      # Sepsis   - Tachycardia, leukocytosis   - Cultures pending   - Continue broad spectrum abx    # Perforated  appendix   - S/P laparoscopic appendectomy   -Management per general surgery.   - Continue abx    # Hx margot transplant   -Patient continued on mycophenolate and tacrolimus.    # Leukocytosis   -Secondary to perforated appendix.   -Will monitor white blood cell count.    # Acute on CKD stage 3   - Baseline GFR in in the 50's.   - Continue IVF.   - BMP in the am.      Plan of care discussed with patient at bedside.    John Paul Hernandez DO      SURGERY:    DATE OF OPERATION:  1/8/2024  PREOPERATIVE DIAGNOSIS: Severe enteritis  POSTOPERATIVE DIAGNOSIS: Acute perforated appendicitis   PROCEDURE PERFORMED: Diagnostic laparoscopy, laparoscopic appendectomy.   SURGEON:  Jasmin Ovalle MD  ANESTHESIA: General.   SPECIMEN: Appendix to Pathology.   BLOOD LOSS: 5 mL.   COMPLICATIONS: None.   INDICATION FOR OPERATION: The patient is a 38-year-old female who presented to the emergency room with worsening lower abdominal pain.  The patient was seen yesterday where CT scan showed enteritis and cystitis.  She was prescribed antibiotics but returned with worsening symptoms.  Repeat CT scan showed worsening enteritis.  She also had an escalating white count.  She was offered urgent diagnostic laparoscopy, possible exploratory laparotomy, possible bowel resection.  The risks, benefits, and alternatives of this procedure were discussed in detail with the patient.  Risks include, but are not limited to, bleeding, wound infection, injury to other intraabdominal contents, and the possibility that this is not appendicitis.  The patient was agreeable to proceed with the operation.   OPERATIVE TECHNIQUE: After informed consent was obtained, the patient was taken to the operating room and placed in the supine position. General anesthesia was induced, and the abdomen was prepped and draped in usual sterile fashion.  The patient was placed head up, right side up.  A previous laparoscopic incision in the right upper quadrant was  entered with an 11 blade scalpel.  The Veress needle was passed into the abdomen, and pneumoperitoneum was instituted to a pressure of 15 without difficulty. The Veress needle was then withdrawn, and a 5-mm trocar was passed through this incision site. The abdomen was inspected and found to be grossly normal.  There is some scant adhesions in the lower abdomen.  The kidney could be seen bulging into the left lower abdomen, resting in the patient's flank outside of the true abdomen.  Under direct vision, a 5 mm port was placed through the lower midline scar.    The patient was then placed head down.  The small bowel was run and found to be severely inflamed, centered around the tip of the cecum.  Carefully, the loops of bowel were teased away from this area of inflammation.  The cecum was traced down to its end, and the appendix was identified.  It was found to be severely inflamed and at the center of the loops of bowel.  The lower midline trocar site was enlarged to a 12 mm port.  Another 5 mm port was placed in the left lower quadrant.  With careful traction, the appendix was delivered up into the abdomen, away from the loops of small bowel.  The mesoappendix was dissected free from the appendix itself. A fire of the LAVELLE stapler was used to transect the appendix at its base and the mesoappendix.  The appendix was then placed into an Endo Catch bag and withdrawn through the 12-mm port site.  Inspection of the right lower quadrant revealed further indurated tissue that was carefully bluntly dissected free from the surrounding tissue.  With further dissection, the tip of the appendix was actually discovered, remaining in the deep abdomen having perforated prior to the dissection beginning.  These bits of the appendix were then removed through the 12 mm port.  The abdomen was then copiously irrigated and suctioned until the irrigant returned clear and colorless. The staple line was inspected and found to be intact.  The 12-mm port was then withdrawn under direct vision, the pneumoperitoneum was aspirated, and the remaining ports withdrawn. The fascia at the 12-mm port site was reapproximated with an 0-Maxon suture, and the skin at all 3 incisions was reapproximated with subcuticular Vicryl suture. Half-percent Marcaine with epinephrine was injected into all the incisions to help with postoperative pain control. Steri-Strips and Mastisol were placed across the incisions, as well as sterile dressings. The patient tolerated the procedure well, was extubated in the OR, and went to the PACU in good condition.  FINDINGS: Acute, ruptured appendicitis without abscess  Jasmin Ovalle MD               Routing History    Vitals (last day) before discharge       Date/Time Temp Pulse Resp BP SpO2 Weight O2 Device O2 Flow Rate (L/min) [facility]    01/09/24 0754 98.2 °F (36.8 °C) 88 18 120/72 98 % -- None (Room air) --     01/09/24 0500 97 °F (36.1 °C) 87 16 120/74 100 % -- None (Room air) -- LA    01/09/24 0300 -- 84 -- -- 100 % -- None (Room air) -- LA    01/09/24 0100 -- 89 -- -- 99 % -- -- -- LA    01/09/24 0046 -- -- -- -- -- 100 lb -- -- LA    01/09/24 0046 98.3 °F (36.8 °C) 115 18 107/71 99 % -- Nasal cannula 2 L/min     01/09/24 0030 -- 105 14 110/69 99 % -- Nasal cannula 2 L/min     01/09/24 0025 -- 114 15 -- 98 % -- Nasal cannula 2 L/min     01/09/24 0015 -- 114 22 110/69 98 % -- Nasal cannula 2 L/min     01/09/24 0000 98.3 °F (36.8 °C) 120 18 -- 98 % -- Nasal cannula 2 L/min     01/08/24 2355 -- 111 17 113/65 98 % -- Nasal cannula 2 L/min     01/08/24 2350 -- 109 16 -- 98 % -- Nasal cannula 2 L/min     01/08/24 2345 -- 123 19 -- 89 % -- Nasal cannula 2 L/min     01/08/24 2340 -- 119 20 114/68 91 % -- None (Room air) --     01/08/24 2335 -- 124 22 -- 92 % -- None (Room air) --     01/08/24 2330 -- 127 23 -- 93 % -- None (Room air) --     01/08/24 2325 -- -- 26 127/73 -- -- -- --     01/08/24 2325 -- 128 --  -- 95 % -- -- -- LS    01/08/24 2323 -- 137 24 127/72 95 % -- -- -- LS    01/08/24 2320 -- 138 20 127/72 96 % -- -- -- LS    01/08/24 2318 97.8 °F (36.6 °C) -- 22 130/67 97 % -- None (Room air) -- LS    01/08/24 2045 -- 102 20 105/76 98 % -- None (Room air) -- RK    01/08/24 1959 -- 109 20 107/76 100 % -- None (Room air) -- RK    01/08/24 1905 -- 88 -- 99/69 100 % -- -- -- DM    01/08/24 1815 -- 91 20 -- 100 % -- -- -- DM    01/08/24 1805 -- 89 18 100/62 98 % -- -- -- DM    01/08/24 1730 -- 110 32 111/77 99 % -- -- -- DM    01/08/24 1715 -- 122 21 -- 100 % -- -- -- DM    01/08/24 1714 98.8 °F (37.1 °C) -- -- -- -- -- -- -- DM    01/08/24 1710 -- 115 23 -- 100 % -- -- -- DM    01/08/24 1647 -- 125 21 111/80 100 % -- -- -- DM    01/08/24 1645 -- -- -- -- -- -- None (Room air) -- DM    01/08/24 1517 99.2 °F (37.3 °C) 125 16 115/74 95 % 100 lb None (Room air) -- JEANNE

## 2024-01-16 NOTE — PROGRESS NOTES
Follow Up Visit Note       Active Problems      1. History of chronic cystitis    2. Post-op pain          Chief Complaint   Chief Complaint   Patient presents with    Post-Op     P/O--Laparoscopic Appendectomy--1/8 ES  Pt. States to still be in pain   Pt. States to have difficulties sleeping   Pt. Stated nausea and vomiting for the first 5 days post op   Pt. Denied fever/chills            History of Present Illness  Elvira is a 38 year old female who underwent laparoscopic appendectomy on 1/8/2024. She presents to clinic today for follow up.    She reports following surgery she has had continued lower pelvic pain. She reports pain is similar to UTI she has had in the past. She denies dysuria or hematuria.She reports associated nausea and emesis for the first 2 days follow surgery. She denies nausea or vomiting currently. She reports constipation following surgery. She denies hematochezia or melena. She denies fever or chills. She completed her course of Augmentin.     Specimen pathology as below:  Appendix, appendectomy:  -Acute appendicitis with transmural defect, and serositis with focal early abscess formation.    She is requesting a referral to a urologist for concern of chronic cystitis   Allergies  Nan has No Known Allergies.    Past Medical / Surgical / Social / Family History    The past medical and past surgical history have been reviewed by me today.    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     Past Surgical History:   Procedure Laterality Date    CHOLECYSTECTOMY  2011    TRANSPLANTATION OF KIDNEY  2013       The family history and social history have been reviewed by me today.    Family History   Problem Relation Age of  Onset    Heart Disease Maternal Grandfather     Heart Disease Paternal Grandmother     Cancer Paternal Grandfather         colon     Social History     Socioeconomic History    Marital status:     Number of children: 1   Tobacco Use    Smoking status: Never    Smokeless tobacco: Never   Substance and Sexual Activity    Alcohol use: No    Drug use: No    Sexual activity: Yes     Partners: Male     Comment: no contraception - but not able to get pregnant, pt has never had a period - adopted a child        Current Outpatient Medications:     phenazopyridine 200 MG Oral Tab, Take 1 tablet (200 mg total) by mouth 3 (three) times daily., Disp: , Rfl:     amoxicillin clavulanate 875-125 MG Oral Tab, Take 1 tablet by mouth 2 (two) times daily for 7 days., Disp: 14 tablet, Rfl: 0    oxyCODONE 5 MG Oral Tab, Take 1 tablet (5 mg total) by mouth every 4 (four) hours as needed for Pain., Disp: 15 tablet, Rfl: 0    Naloxone HCl 4 MG/0.1ML Nasal Liquid, 4 mg by Nasal route as needed. If patient remains unresponsive, repeat dose in other nostril 2-5 minutes after first dose., Disp: 1 kit, Rfl: 0    ondansetron 8 MG Oral Tablet Dispersible, Take 1 tablet (8 mg total) by mouth every 6 (six) hours as needed for Nausea., Disp: 10 tablet, Rfl: 0    tacrolimus (PROGRAF) 1 MG Oral Cap, Take 2 capsules (2 mg total) by mouth 2 (two) times daily., Disp: , Rfl:     mycophenolate sodium (MYFORTIC) 360 MG Oral Tab EC, Take 180 mg by mouth 2 (two) times daily before meals., Disp: , Rfl:      Review of Systems  The Review of Systems has been reviewed by me during today.  Review of Systems   Constitutional:  Negative for appetite change, chills, fatigue and fever.   Gastrointestinal:  Negative for abdominal distention, abdominal pain, constipation, diarrhea, nausea and vomiting.   Genitourinary:  Positive for pelvic pain. Negative for difficulty urinating, dysuria, frequency and hematuria.   Skin:  Negative for pallor, rash and wound.         Physical Findings   Pulse 84   Temp 97.3 °F (36.3 °C) (Temporal)   Physical Exam  Vitals reviewed.   Constitutional:       General: She is not in acute distress.     Appearance: Normal appearance. She is not ill-appearing.   HENT:      Head: Normocephalic and atraumatic.   Eyes:      General: No scleral icterus.     Conjunctiva/sclera: Conjunctivae normal.   Pulmonary:      Effort: Pulmonary effort is normal. No respiratory distress.   Abdominal:      General: Abdomen is flat. There is no distension.      Palpations: Abdomen is soft.      Tenderness: There is abdominal tenderness. There is no guarding or rebound.      Comments: Incisions are clean, dry with steri strips in place. No surrounding erythema or drainage. No signs of infection. Lower abdomen non-distended but firm in suprapubic region.    Musculoskeletal:      Cervical back: Normal range of motion.   Skin:     General: Skin is warm and dry.      Coloration: Skin is not jaundiced.   Neurological:      Mental Status: She is alert.   Psychiatric:         Mood and Affect: Mood normal.         Behavior: Behavior normal.          Assessment   1. History of chronic cystitis    2. Post-op pain        Plan   Diet as tolerated  Tylenol scheduled for pain control as needed  Continue local wound care, soap and water to the incisions. Discussed steri strips will fall off with time.  Patient request outpatient referral to urology to discuss current bladder pain and history of chronic cystitis. Patient given information for Dr. Ellis office.   CBC and UA ordered to evaluate for resolution of WBC and to evaluate for UA given ongoing pelvic pressure  I recommend she reach out to her Neprhologist at [facility] as well as her PCP to discuss ongoing urinary symptoms.   I did discuss with patient should she develop severe abdominal pain, nausea, vomiting, fever, chills, obstipation, that she contact the office or present to the emergency department for further  evaluation.      Orders Placed This Encounter   Procedures    CBC W Differential W Platelet    UA/M WITH CULTURE REFLEX [3020] [Q]       Imaging & Referrals   OP REFERRAL TO UROLOGY    Follow Up  She is scheduled to follow up with Dr. Ovalle in 2-3 weeks, sooner if needed    Elisabet Cha PA-C

## 2024-01-16 NOTE — PAYOR COMM NOTE
Discharge Notification    Patient Name: ADINA ZHENG  Payor: [employer]  Subscriber #: [id number]  Authorization Number: [id number]  Admit Date/Time: 1/8/2024 4:40 PM  Discharge Date/Time: 1/9/2024 12:31 PM

## 2024-01-18 ENCOUNTER — OFFICE VISIT (OUTPATIENT)
Dept: SURGERY | Facility: CLINIC | Age: 39
End: 2024-01-18

## 2024-01-18 DIAGNOSIS — R82.90 URINE FINDING: ICD-10-CM

## 2024-01-18 DIAGNOSIS — N31.9 NEUROGENIC BLADDER: Primary | ICD-10-CM

## 2024-01-18 LAB
APPEARANCE: CLEAR
BILIRUBIN: NEGATIVE
GLUCOSE (URINE DIPSTICK): NEGATIVE MG/DL
KETONES (URINE DIPSTICK): NEGATIVE MG/DL
LEUKOCYTES: NEGATIVE
MULTISTIX LOT#: ABNORMAL NUMERIC
NITRITE, URINE: NEGATIVE
PH, URINE: 5.5 (ref 4.5–8)
PROTEIN (URINE DIPSTICK): 30 MG/DL
SPECIFIC GRAVITY: 1.01 (ref 1–1.03)
URINE-COLOR: YELLOW
UROBILINOGEN,SEMI-QN: 0.2 MG/DL (ref 0–1.9)

## 2024-01-18 PROCEDURE — 81003 URINALYSIS AUTO W/O SCOPE: CPT

## 2024-01-18 PROCEDURE — 99204 OFFICE O/P NEW MOD 45 MIN: CPT

## 2024-01-18 RX ORDER — PHENAZOPYRIDINE HYDROCHLORIDE 100 MG/1
100 TABLET, FILM COATED ORAL 3 TIMES DAILY PRN
Qty: 10 TABLET | Refills: 0 | Status: SHIPPED | OUTPATIENT
Start: 2024-01-18

## 2024-01-18 RX ORDER — OXYBUTYNIN CHLORIDE 10 MG/1
10 TABLET, EXTENDED RELEASE ORAL DAILY
Qty: 90 TABLET | Refills: 3 | Status: SHIPPED | OUTPATIENT
Start: 2024-01-18

## 2024-01-18 NOTE — PROGRESS NOTES
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    Pt began having RLQ pain on 1/5/24. She went to IC for this initially and UA showed blood so she was started on macrobid. No culture done at this time.    Pain persisted on 1/7/24 for which she went to the ED. She was given rocephin and discharged on cephalexin 400mg bid qid. Urine culture neg. WBC 17.8. CT showed cystitis of bladder and possible infectious/inflammatory enteritis with large stool volume in colon.     Pain intensified on 1/8/24 and WBC continued to elevate to 26.4. She was admitted and had exlap found to have ruptured appendix. Following surgery, her RLQ persists and now feels it is in general lower pelvis.     Pt CIC 6-7x/day at baseline with volumes of 150-200mL. Able to free void very little. Has been CIC since childhood. Thought to have neurogenic bladder 2/2 radiation for endodermal tumor. Was followed by [facility] u[facility] but sx have been manageable so has not seen urology in awhile. Recently with RLQ pain, she feels she needs to cath [id number] with mild relief of pain but will only get 30-40mL output. She used to get frequent UTIs with colonization but has not in the past 5+yrs.     BM has been more regular since hospitalization. On stool softener daily. Drinks 40-60oz water a day, 1 cup coffee, no other irritants.     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      Past Surgical  History:   Procedure Laterality Date    CHOLECYSTECTOMY  2011    TRANSPLANTATION OF KIDNEY  2013      Family History   Problem Relation Age of Onset    Heart Disease Maternal Grandfather     Heart Disease Paternal Grandmother     Cancer Paternal Grandfather         colon      Social History:   Social History     Socioeconomic History    Marital status:     Number of children: 1   Tobacco Use    Smoking status: Never    Smokeless tobacco: Never   Substance and Sexual Activity    Alcohol use: No    Drug use: No    Sexual activity: Yes     Partners: Male     Comment: no contraception - but not able to get pregnant, pt has never had a period - adopted a child     Social Determinants of Health     Food Insecurity: No Food Insecurity (1/9/2024)    Food Insecurity     Food Insecurity: Never true   Transportation Needs: No Transportation Needs (1/9/2024)    Transportation Needs     Lack of Transportation: No   Housing Stability: Low Risk  (1/9/2024)    Housing Stability     Housing Instability: No        Allergies  No Known Allergies    Review of Systems:   A 10-point review of systems was completed and is negative other than as noted above.    Physical Exam:   There were no vitals taken for this visit.    GENERAL APPEARANCE: no acute distress  NEUROLOGIC: converses appropriately  HEAD: atraumatic, normocephalic  LUNGS: non-labored breathing  ABDOMEN: soft, nontender, non-distended  BACK: no CVA tenderness  PSYCH: appropriate affect and mood    Results:     Laboratory Data:  Lab Results   Component Value Date    WBC 11.5 (H) 01/16/2024    HGB 9.7 (L) 01/16/2024    .0 (H) 01/16/2024     Lab Results   Component Value Date     01/09/2024    K 4.4 01/09/2024     01/09/2024    CO2 16.0 (L) 01/09/2024    BUN 27 (H) 01/09/2024     (H) 01/09/2024    AST 70 (H) 01/09/2024     (H) 01/09/2024    TP 6.7 01/09/2024    ALB 2.9 (L) 01/09/2024    CA 9.8 01/09/2024       Urinalysis Results (last 3  years):  Recent Labs     01/07/24  1657 01/16/24  1413 01/18/24  1039   COLORUR Yellow Yellow  --    CLARITY Clear Clear  --    SPECGRAVITY 1.010 1.014 1.015   PHURINE 5.5 6.0 5.5   PROUR Negative 30*  --    GLUUR Negative Normal  --    KETUR Negative Negative  --    BILUR Negative Negative  --    BLOODURINE Small* Negative  --    NITRITE Negative Negative Negative   UROBILINOGEN 0.2 Normal  --    LEUUR Small* Negative  --    WBCUR 11-20* 1-5  --    RBCUR 0-2 0-2  --    BACUR Rare* None Seen  --        Urine Culture Results (last 3 years):  Lab Results   Component Value Date    URINECUL No Growth at 18-24 hrs. 01/07/2024    URINECUL No Growth at 18-24 hrs. 01/28/2018    URINECUL 15,000 CFU/ML Escherichia coli (A) 12/21/2016    URINECUL 20,000 CFU/ML Streptococcus agalactiae (A) 01/02/2016    URINECUL <10,000 CFU/ML Gram negative velma 01/02/2016       Imaging  CT ABDOMEN+PELVIS(CPT=74176)    Result Date: 1/8/2024  PROCEDURE:  CT ABDOMEN+PELVIS (CPT=74176)  COMPARISON:  [facility], CT, CT ABDOMEN+PELVIS KIDNEYSTONE 2D RNDR(NO IV,NO ORAL)(CPT=74176), 1/07/2024, 7:11 PM.  INDICATIONS:  abd pain, seen at [facility] yesterday for same  TECHNIQUE:  Unenhanced multislice CT scanning was performed from the dome of the diaphragm to the pubic symphysis.  Dose reduction techniques were used. Dose information is transmitted to the ACR (American College of Radiology) NRDR (National Radiology Data Registry) which includes the Dose Index Registry.  PATIENT STATED HISTORY: (As transcribed by Technologist)  Right lower abd pain. Hx of frequent utis    FINDINGS:  LIVER:  No enlargement, atrophy, abnormal density, or significant focal lesion.  BILIARY:  There has been a previous cholecystectomy. PANCREAS:  No lesion, fluid collection, ductal dilatation, or atrophy.  SPLEEN:  No enlargement or focal lesion.  KIDNEYS:  Native kidneys have been removed.  There is a left lower quadrant renal transplant noted.  Renal transplant is unchanged in  appearance since the recent prior study. ADRENALS:  No mass or enlargement.  AORTA/VASCULAR:    Unremarkable as seen on non-contrast imaging. RETROPERITONEUM:  No mass or adenopathy.  BOWEL/MESENTERY:  Markedly abnormal loop of small bowel is noted in right lower quadrant.  There is feculent debris within this distended loop of small bowel.  There is extensive stranding in the adjacent mesentery.  The finding has progressed significantly since previous study.  This could represent severe focal enteritis.  Possibility of ischemia of the short segment of bowel is raised by progression of findings.  Correlation with clinical history is necessary.  There is no specific evidence  of extraluminal air or drainable fluid collection within the limits of a noncontrast study. ABDOMINAL WALL:  No mass or hernia.  URINARY BLADDER:  There is moderate distention of urinary bladder.  Mild stranding around bladder is noted.  Correlation with any clinical evidence of cystitis is necessary. PELVIC NODES:  No adenopathy.  PELVIC ORGANS:  Uterus is not visualized and may have been removed. BONES:  Advanced degeneration right hip is noted with large subchondral cysts and subchondral sclerosis noted.  Diffuse mixed sclerotic and lucent appearance of all bony structures is noted and could represent chronic renal osteodystrophy. LUNG BASES:  There is reticular nodular tree-in-bud type appearance diffusely in lower lobes which could represent diffuse endobronchial infection or mucous plugging and multiple small airways. OTHER:  Negative.             CONCLUSION:  1. Progressive inflammatory change around the distal loop of ileum in right lower quadrant which is now markedly distended and contains feculent debris.  There is extensive stranding around this loop of bowel now.  There is no significant upstream dilatation of bowel loops.  This could represent worsening of severe inflammatory enteritis although ischemic enteritis would not be  excluded.  There is no free air or drainable fluid collection detected.  A normal appendix is not visualized. 2. There is stranding around urinary bladder which could indicate cystitis.  This finding is unchanged. 3. This patient has had bilateral nephrectomies and there is a transplanted kidney in the left lower quadrant. 4. End-stage degeneration right hip is noted. 5. Diffuse sclerotic appearance of all bony structures likely indicates chronic renal osteodystrophy.    LOCATION:  [facility]   Dictated by (CST): Dave Santiago MD on 1/08/2024 at 6:36 PM     Finalized by (CST): Dave Santiago MD on 1/08/2024 at 6:52 PM       CT ABDOMEN+PELVIS KIDNEYSTONE 2D RNDR(NO IV,NO ORAL)(CPT=74176)    Result Date: 1/7/2024  PROCEDURE:  CT ABDOMEN+PELVIS KIDNEYSTONE 2D RNDR(NO IV,NO ORAL)(CPT=74176)  COMPARISON:  None.  INDICATIONS:  ABD pain  TECHNIQUE:  Unenhanced multislice CT scanning from above the kidneys to below the urinary bladder.  2D rendering are generated on the CT scanner workstation to localize potential stones in the cranio-caudal plane.  Dose reduction techniques were used. Dose information is transmitted to the ACR (American College of Radiology) NRDR (National Radiology Data Registry) which includes the Dose Index Registry.  PATIENT STATED HISTORY: (As transcribed by Technologist)  Abdominal pain for few days; no vomiting    FINDINGS:  KIDNEYS:  Bilateral nephrectomy.  Normal morphology of left lower quadrant renal transplant without evidence of transplant obstruction. BLADDER:  Moderate concentric thickening of the urinary bladder indicating cystitis.  No intraluminal calculi. ADRENALS:  No mass or enlargement.  LIVER:  No enlargement, atrophy, abnormal density, or significant focal lesion.  BILIARY:  Cholecystectomy with expected prominence of the common bile duct. PANCREAS:  No lesion, fluid collection, ductal dilatation, or atrophy.  SPLEEN:  No enlargement or focal lesion.  AORTA/VASCULAR:  No aneurysm.   RETROPERITONEUM:  No mass or adenopathy.  BOWEL/MESENTERY:  Mild fluid distention of distal small bowel loops with associated edema/congestion of the small bowel mesentery suggesting infectious/inflammatory enteritis.  No evidence of bowel obstruction.  The appendix is not definitively visualized.  Large stool volume noted diffusely throughout the colon. ABDOMINAL WALL:  No mass or hernia.  BONES:  Advanced osteoarthritis of the right hip.  Mild height loss of multiple vertebral bodies, chronicity indeterminate. PELVIC ORGANS:  Uterus and ovaries are absent or atrophic. LUNG BASES:  Clustered tree-in-bud type opacities noted throughout the visualized right middle right lower lobes indicating infectious bronchiolitis or aspiration. OTHER:  Prominent asymmetry in size of the proximal thighs.            CONCLUSION:   1. Moderate concentric thickening of the urinary bladder indicating cystitis.  Recommend correlation with urinalysis.  2. Mild fluid distention of distal small bowel loops with associated edema/congestion of the small bowel mesentery suggesting infectious/inflammatory enteritis.  Correlate with lower GI symptoms.  3. Large stool volume noted throughout the colon.  4. Clustered tree-in-bud opacities of the visualized right middle and right lower lobes indicating infectious bronchiolitis or aspiration.     LOCATION:  [facility]   Dictated by (CST): Mike Dawson MD on 1/07/2024 at 7:49 PM     Finalized by (CST): Mike Dawson MD on 1/07/2024 at 7:55 PM           Impression:   Recommendations:  Neurogenic bladder  - discussed she may be having UTI vs. Bladder spasms   - urine culture today and if neg, begin oxybutynin 10mg ER   - if pos culture, will treat and reassess sx  - if neg culture and no improvement with oxybutynin, should schedule UDS and cysto   - keep bowels regular   - discussed dietery modifications such as drinking at least 40-60 oz water per day or enough to keep urine clear and to avoid dietary  irritants such as coffee, tea, carbonated drinks, soda, juice, spicy, and citrus  - discussed behavioral modifications such as double voiding, bending over after void to completely empty, and post coital hygiene    A total of 30 minutes were spent on the visit including chart review in preparation for the visit, time with the patient, placing orders and communication with other providers where appropriate.    Thank you very much for this consult. Please call if there are any questions or concerns.     Fernanda Moreno PA-C  Urology  [facility]  Phone: [phone]    Date: 1/18/2024  Time: 11:55 AM

## 2024-02-01 ENCOUNTER — TELEPHONE (OUTPATIENT)
Dept: SURGERY | Facility: CLINIC | Age: 39
End: 2024-02-01

## 2024-02-01 NOTE — TELEPHONE ENCOUNTER
<10k e. Coli, likely not significant to cause bladder sx. Pt should f/u if no improvement with oxybutynin.

## (undated) DEVICE — LIGHT HANDLE

## (undated) DEVICE — SUTURE MCRYL SZ 4-0 L18IN ABSRB UD L19MM PS-2

## (undated) DEVICE — 2, DISPOSABLE SUCTION/IRRIGATOR WITHOUT DISPOSABLE TIP: Brand: STRYKEFLOW

## (undated) DEVICE — INSUFFLATION NEEDLE TO ESTABLISH PNEUMOPERITONEUM.: Brand: INSUFFLATION NEEDLE

## (undated) DEVICE — GENERAL LAPAROS CDS-LF: Brand: MEDLINE INDUSTRIES, INC.

## (undated) DEVICE — MONOFILAMENT ABSORBABLE SUTURE: Brand: MAXON

## (undated) DEVICE — TROCAR: Brand: KII FIOS FIRST ENTRY

## (undated) DEVICE — SOLUTION IRRIG 1000ML 0.9% NACL USP BTL

## (undated) DEVICE — ENDOPATH ECHELON ENDOSCOPIC LINEAR CUTTER RELOADS, BLUE, 60MM: Brand: ECHELON ENDOPATH

## (undated) DEVICE — TROCAR: Brand: KII SHIELDED BLADED ACCESS SYSTEM

## (undated) DEVICE — STAPLER MED SHFT L340MM JAW L60MM STD ECHELON

## (undated) DEVICE — HUNTER GASPER TIP, DISPOSABLE: Brand: RENEW

## (undated) DEVICE — SLEEVE COMPR M KNEE LEN SGL USE KENDALL SCD

## (undated) DEVICE — STERILE POLYISOPRENE POWDER-FREE SURGICAL GLOVES: Brand: PROTEXIS

## (undated) DEVICE — TROCAR: Brand: KII® SLEEVE

## (undated) NOTE — LETTER
[address]  [address]  Authorization for Surgical Operation and Procedure     Date:___________                                                                                                         Time:__________  I hereby authorize Surgeon(s):  Jasmin Ovalle MD, my physician and his/her assistants (if applicable), which may include medical students, residents, and/or fellows, to perform the following surgical operation/ procedure and administer such anesthesia as may be determined necessary by my physician:  Operation/Procedure name (s) Procedure(s):  DIAGNOSTIC LAPAROSCOPY, POSSIBLE EXPLORATORY LAPAROTOMY, POSSIBLE BOWEL RESECTION on Nan Woodward   2.   I recognize that during the surgical operation/procedure, unforeseen conditions may necessitate additional or different procedures than those listed above.  I, therefore, further authorize and request that the above-named surgeon, assistants, or designees perform such procedures as are, in their judgment, necessary and desirable.    3.   My surgeon/physician has discussed prior to my surgery the potential benefits, risks and side effects of this procedure; the likelihood of achieving goals; and potential problems that might occur during recuperation.  They also discussed reasonable alternatives to the procedure, including risks, benefits, and side effects related to the alternatives and risks related to not receiving this procedure.  I have had all my questions answered and I acknowledge that no guarantee has been made as to the result that may be obtained.    4.   Should the need arise during my operation/procedure, which includes change of level of care prior to discharge, I also consent to the administration of blood and/or blood products.  Further, I understand that despite careful testing and screening of blood or blood products by collecting agencies, I may still be subject to ill effects as a result of  receiving a blood transfusion and/or blood products.  The following are some, but not all, of the potential risks that can occur: fever and allergic reactions, hemolytic reactions, transmission of diseases such as Hepatitis, AIDS and Cytomegalovirus (CMV) and fluid overload.  In the event that I wish to have an autologous transfusion of my own blood, or a directed donor transfusion, I will discuss this with my physician.  Check only if Refusing Blood or Blood Products  I understand refusal of blood or blood products as deemed necessary by my physician may have serious consequences to my condition to include possible death. I hereby assume responsibility for my refusal and release the hospital, its personnel, and my physicians from any responsibility for the consequences of my refusal.          o  Refuse      5.   I authorize the use of any specimen, organs, tissues, body parts or foreign objects that may be removed from my body during the operation/procedure for diagnosis, research or teaching purposes and their subsequent disposal by hospital authorities.  I also authorize the release of specimen test results and/or written reports to my treating physician on the hospital medical staff or other referring or consulting physicians involved in my care, at the discretion of the Pathologist or my treating physician.    6.   I consent to the photographing or videotaping of the operations or procedures to be performed, including appropriate portions of my body for medical, scientific, or educational purposes, provided my identity is not revealed by the pictures or by descriptive texts accompanying them.  If the procedure has been photographed/videotaped, the surgeon will obtain the original picture, image, videotape or CD.  The hospital will not be responsible for storage, release or maintenance of the picture, image, tape or CD.    7.   I consent to the presence of a  or observers in the operating room  as deemed necessary by my physician or their designees.    8.   I recognize that in the event my procedure results in extended X-Ray/fluoroscopy time, I may develop a skin reaction.    9. If I have a Do Not Attempt Resuscitation (DNAR) order in place, that status will be suspended while in the operating room, procedural suite, and during the recovery period unless otherwise explicitly stated by me (or a person authorized to consent on my behalf). The surgeon or my attending physician will determine when the applicable recovery period ends for purposes of reinstating the DNAR order.  10. Patients having a sterilization procedure: I understand that if the procedure is successful the results will be permanent and it will therefore be impossible for me to inseminate, conceive, or bear children.  I also understand that the procedure is intended to result in sterility, although the result has not been guaranteed.   11. I acknowledge that my physician has explained sedation/analgesia administration to me including the risk and benefits I consent to the administration of sedation/analgesia as may be necessary or desirable in the judgment of my physician.    I CERTIFY THAT I HAVE READ AND FULLY UNDERSTAND THE ABOVE CONSENT TO OPERATION and/or OTHER PROCEDURE.    _________________________________________  __________________________________  Signature of Patient     Signature of Responsible Person         ___________________________________         Printed Name of Responsible Person           _________________________________                 Relationship to Patient  _________________________________________  ______________________________  Signature of Witness          Date  Time      Patient Name: Nan HUMPHREY Trace     : 1985                 Printed: 2024     Medical Record #: [id number]                     Page 1 of 2                                    [address]   [address]    Consent for Anesthesia    INan agree to be cared for by an anesthesiologist, who is specially trained to monitor me and give me medicine to put me to sleep or keep me comfortable during my procedure    I understand that my anesthesiologist is not an employee or agent of [facility] or [employer]. He or she works for [employer].    As the patient asking for anesthesia services, I agree to:  Allow the anesthesiologist (anesthesia doctor) to give me medicine and do additional procedures as necessary. Some examples are: Starting or using an “IV” to give me medicine, fluids or blood during my procedure, and having a breathing tube placed to help me breathe when I’m asleep (intubation). In the event that my heart stops working properly, I understand that my anesthesiologist will make every effort to sustain my life, unless otherwise directed by [facility] Do Not Resuscitate documents.  Tell my anesthesia doctor before my procedure:  If I am pregnant.  The last time that I ate or drank.  All of the medicines I take (including prescriptions, herbal supplements, and pills I can buy without a prescription (including street drugs/illegal medications). Failure to inform my anesthesiologist about these medicines may increase my risk of anesthetic complications.  If I am allergic to anything or have had a reaction to anesthesia before.  I understand how the anesthesia medicine will help me (benefits).  I understand that with any type of anesthesia medicine there are risks:  The most common risks are: nausea, vomiting, sore throat, muscle soreness, damage to my eyes, mouth, or teeth (from breathing tube placement).  Rare risks include: remembering what happened during my procedure, allergic reactions to medications, injury to my airway, heart, lungs, vision, nerves, or muscles and in extremely rare instances death.  My doctor has explained to me other choices  available to me for my care (alternatives).  Pregnant Patients (“epidural”):  I understand that the risks of having an epidural (medicine given into my back to help control pain during labor), include itching, low blood pressure, difficulty urinating, headache or slowing of the baby’s heart. Very rare risks include infection, bleeding, seizure, irregular heart rhythms and nerve injury.  Regional Anesthesia (“spinal”, “epidural”, & “nerve blocks”):  I understand that rare but potential complications include headache, bleeding, infection, seizure, irregular heart rhythms, and nerve injury.    I can change my mind about having anesthesia services at any time before I get the medicine.    _____________________________________________________________________________  Patient (or Representative) Signature/Relationship to Patient  Date   Time    _____________________________________________________________________________   Name (if used)    Language/Organization   Time    _____________________________________________________________________________  Anesthesiologist Signature     Date   Time  I have discussed the procedure and information above with the patient (or patient’s representative) and answered their questions. The patient or their representative has agreed to have anesthesia services.    _____________________________________________________________________________  Witness        Date   Time  I have verified that the signature is that of the patient or patient’s representative, and that it was signed before the procedure  Patient Name: Nan Woodward     : 1985                 Printed: 2024     Medical Record #: [id number]                     Page 2 of 2